# Patient Record
Sex: FEMALE | Race: WHITE
[De-identification: names, ages, dates, MRNs, and addresses within clinical notes are randomized per-mention and may not be internally consistent; named-entity substitution may affect disease eponyms.]

---

## 2017-07-14 ENCOUNTER — HOSPITAL ENCOUNTER (EMERGENCY)
Dept: HOSPITAL 75 - ER | Age: 22
Discharge: HOME | End: 2017-07-14
Payer: COMMERCIAL

## 2017-07-14 VITALS — BODY MASS INDEX: 24.16 KG/M2 | HEIGHT: 65 IN | WEIGHT: 145 LBS

## 2017-07-14 VITALS — SYSTOLIC BLOOD PRESSURE: 116 MMHG | DIASTOLIC BLOOD PRESSURE: 74 MMHG

## 2017-07-14 DIAGNOSIS — Z3A.08: ICD-10-CM

## 2017-07-14 DIAGNOSIS — Z98.890: ICD-10-CM

## 2017-07-14 DIAGNOSIS — O02.1: Primary | ICD-10-CM

## 2017-07-14 DIAGNOSIS — K21.9: ICD-10-CM

## 2017-07-14 LAB
BILIRUB UR QL STRIP: NEGATIVE
KETONES UR QL STRIP: NEGATIVE
LEUKOCYTE ESTERASE UR QL STRIP: NEGATIVE
NITRITE UR QL STRIP: NEGATIVE
PH UR STRIP: 6.5 [PH] (ref 5–9)
PROT UR QL STRIP: NEGATIVE
SP GR UR STRIP: 1.01 (ref 1.02–1.02)
SQUAMOUS #/AREA URNS HPF: (no result) /HPF
UROBILINOGEN UR-MCNC: NORMAL MG/DL
WBC #/AREA URNS HPF: (no result) /HPF

## 2017-07-14 PROCEDURE — 99282 EMERGENCY DEPT VISIT SF MDM: CPT

## 2017-07-14 PROCEDURE — 81000 URINALYSIS NONAUTO W/SCOPE: CPT

## 2017-07-14 PROCEDURE — 76817 TRANSVAGINAL US OBSTETRIC: CPT

## 2017-07-14 PROCEDURE — 84702 CHORIONIC GONADOTROPIN TEST: CPT

## 2017-07-14 PROCEDURE — 36415 COLL VENOUS BLD VENIPUNCTURE: CPT

## 2017-07-14 NOTE — ED GU-FEMALE
General


Chief Complaint:  -Female


Stated Complaint:  POSS MISCARRIAGE


Nursing Triage Note:  


c/o vaginal bleeding since Wed. Cramping started last night. Hx of 6 weeks 


gestation.


Nursing Sepsis Screen:  No Definite Risk


Source:  patient, spouse


Exam Limitations:  no limitations





History of Present Illness


Time seen by provider:  12:41


Initial Comments


21-year-old female patient presents to the emergency department complains of 

vaginal bleeding beginning Wednesday.  Reports today bleeding is similar to a 

menstruation.  Denies fever, nausea, vomiting, yellow or green discharge, 

difficulty with urination.  Patient states she is approximately 6 weeks 

pregnant.  Patient was seen by Dr. Barboza last Thursday with pelvic exam 

performed.


Timing/Duration:  getting worse, other (onset Wednesday.)


Severity/Quality:  cramping


Location:  suprapubic


Radiation:  none


Activities at Onset:  none


Prior Genitourinary Problems:  none


Sexual [place]lin History:  less than 2 months ago, single partner





Allergies and Home Medications


Allergies


Coded Allergies:  


     No Known Drug Allergies (Unverified , 16)





Home Medications


Prenatal Vit W-Ca,Fe,FA(<1 mg) 1 Each Tablet, 1 TAB PO DAILY, (Reported)





Constitutional:  No chills, No dizziness, No fever, No malaise


Respiratory:  no symptoms reported


Cardiovascular:  no symptoms reported


Gastrointestinal:  abdominal pain (suprapubic abdominal cramping), No 

constipation, No diarrhea, No loss of appetite, No nausea, No vomiting


Genitourinary:  see HPI, denies burning, denies discharge, denies dysuria, 

denies frequency, denies flank pain, denies hematuria, pain (intermittent 

suprapubic abdominal cramping), other (vaginal bleeding)


Pregnant:  Yes


Expected Date of Delivery:  2018


LMP:  May 15, 2017


Musculoskeletal:  No back pain


Skin:  no symptoms reported


Psychiatric/Neurological:  No Symptoms Reported


All Other Systemes Reviewed


Negative Unless Noted:  Yes (Negative excepted noted.)





Past Medical-Social-Family Hx


Patient Social History


Recent Foreign Travel:  No


Contact w/Someone Who Travel:  No


Recent Infectious Disease Expo:  No





Immunizations Up To Date


Tetanus Booster (TDap):  Unknown


PED Vaccines UTD:  No





Surgeries


HX Surgeries:  Yes (left knee)





Respiratory


Hx Respiratory Disorders:  No





Cardiovascular


Hx Cardiac Disorders:  No





Neurological


Hx Neurological Disorders:  No





Reproductive System


Pregnant:  Yes


Hx :  2


Hx Para:  1


Hx Total # of Abortions (Spona:  0


Hx Reproductive Disorders:  No


Female Reproductive Disorders:  Denies





Genitourinary


Hx Genitourinary Disorders:  No





Gastrointestinal


Hx Gastrointestinal Disorders:  Yes


Gastrointestinal Disorders:  Gastroesophageal Reflux





Musculoskeletal


Hx Musculoskeletal Disorders:  No





Endocrine


Hx Endocrine Disorders:  No





HEENT


HX ENT Disorders:  No





Cancer


Hx Cancer:  No





Psychosocial


Hx Psychiatric Problems:  No





Integumentary


HX Skin/Integumentary Disorder:  No





Blood Transfusions


Hx Blood Disorders:  No





Reviewed Nursing Assessment


Reviewed/Agree w Nursing PMH:  Yes





Family Medical History


Significant Family History:  No Pertinent Family Hx


Family Medial History:  


Alzheimer's disease


  19 MOTHER (grandfather)


Completed stroke


  19 FATHER (grandfather)


Diabetes mellitus


  19 FATHER (grandparent)


  19 MOTHER (maternal grandparent)


Hypertension


  19 MOTHER (mother)


Non-Hodgkin's lymphoma


Respiratory disorder


  19 MOTHER (copd grandmother)





Physical Exam


Vital Signs





Vital Sign - Last 12Hours








 17





 12:19


 


Temp 97.5


 


Pulse 82


 


Resp 16


 


B/P (MAP) 117/76


 


Pulse Ox 98





Capillary Refill : Less Than 3 Seconds


General Appearance:  WD/WN, no apparent distress


HEENT:  PERRL/EOMI, pharynx normal


Neck:  supple, normal inspection


Cardiovascular:  regular rate, rhythm, no edema, no murmur


Respiratory:  lungs clear, normal breath sounds, no respiratory distress


Gastrointestinal:  normal bowel sounds, non tender (unable to reproduce 

tenderness on exam.), soft, no organomegaly


Back:  normal inspection


Extremities:  no pedal edema, normal capillary refill


Neurologic/Psychiatric:  alert, normal mood/affect, oriented x 3


Skin:  normal color, warm/dry





Progress/Results/Core Measures


Results/Orders


Lab Results





Laboratory Tests








Test


  17


12:51 17


13:35 Range/Units


 


 


Human Chorionic Gonadotropin,


Quant 2914 H


  


  <5  MIU/ML


 


 


Urine Color  YELLOW   


 


Urine Clarity  CLEAR   


 


Urine pH  6.5  5-9  


 


Urine Specific Gravity  1.015 L 1.016-1.022  


 


Urine Protein  NEGATIVE  NEGATIVE  


 


Urine Glucose (UA)  NEGATIVE  NEGATIVE  


 


Urine Ketones  NEGATIVE  NEGATIVE  


 


Urine Nitrite  NEGATIVE  NEGATIVE  


 


Urine Bilirubin  NEGATIVE  NEGATIVE  


 


Urine Urobilinogen  NORMAL  NORMAL  MG/DL


 


Urine Leukocyte Esterase  NEGATIVE  NEGATIVE  


 


Urine RBC (Auto)  5+ H NEGATIVE  


 


Urine RBC  RARE   /HPF


 


Urine WBC  NONE   /HPF


 


Urine Squamous Epithelial


Cells 


  2-5 


   /HPF


 


 


Urine Crystals  NONE   /LPF


 


Urine Bacteria  NEGATIVE   /HPF


 


Urine Casts  NONE   /LPF


 


Urine Mucus  NEGATIVE   /LPF


 


Urine Culture Indicated  NO   








My Orders





Orders - OSEI ROBINS


Hcg,Quantitative (17 12:23)


Us Ob Transvaginal 12048 (17 12:23)


Ua Culture If Indicated (17 13:21)





Vital Signs/I&O





Vital Sign - Last 12Hours








 17





 12:19


 


Temp 97.5


 


Pulse 82


 


Resp 16


 


B/P (MAP) 117/76


 


Pulse Ox 98














Blood Pressure Mean:  90











Diagnostic Imaging





   Diagonstic Imaging:  Ultrasound


   Plain Films/CT/US/NM/MRI:  pelvis


Comments


Single intrauterine in fetus measuring 6 weeks 4 days.  No cardiac motion noted 

suggesting fetal demise.  Can not exclude early pregnancy findings.  findings 

per Dr. Griggs


   Reviewed:  Discussed w/Radiologist (discussed with Dr. Joseph Griggs)





Departure


Communication


Progress Notes


Patient seen and evaluated.  Patient initially reported being 6 weeks gestation

; however, based on LMP patient is 8 wks 4 days.  All laboratory and diagnostic 

findings discussed with the patient.  Patient and spouse both report they were 

able to find a fetal heart rate of 115 bpm last week in Dr. Barboza's 

office.  In light of previously hearing FHT's, findings of U/S today are 

indicative of missed AB with fetal demise. Patient instructed to follow-up with 

Dr. Barboza Monday or Tuesday in his office and to call Monday morning for 

appointment time.  Patient given an outpatient order for repeat HCG quant on .  All return precautions were discussed with the patient as described in 

the discharge instructions of this report.  Patient voices understanding and 

agrees with the treatment plan.  Patient case discussed with Dr. Cullen, he 

agrees with the plan of care.





Impression


Impression:  


 Primary Impression:  


 Missed  with fetal demise before 20 completed weeks of gestation


Disposition:  01 HOME, SELF-CARE


Condition:  Improved





Departure-Patient Inst.


Decision time for Depature:  14:37


Referrals:  


PAULINO BARBOZA MD, CHAD C MD (PCP/Family)


Primary Care Physician


Patient Instructions:  Threatened Miscarriage (DC)





Add. Discharge Instructions:  


All discharge instructions reviewed with patient and/or family. Voiced 

understanding.  Tylenol extra strength over-the-counter as directed for pain.  

Drink plenty of fluids.  No intercourse, use of tampons, or strenuous activity 

until released by Dr. Barboza.  Follow-up with Dr. Barboza Monday or 

Tuesday for recheck, repeat ultrasound, and repeat labs.  Call first thing 

Monday morning for appointment time.  Return to the emergency department for 

worsened pain, fever, vaginal bleeding with greater than 2 pads per hour for 

greater than 2 hours, vaginal discharge, difficulty with urination, or any 

other concerns.


Work/School Note:  Work Release Form   Date Seen in the Emergency Department:  

2017


   Return to Work:  Jul 15, 2017











OSEI ROBINS 2017 12:41

## 2017-07-14 NOTE — XMS REPORT
Continuity of Care Document

 Created on: 2017



GOGO NEGRON

External Reference #: [id number]

: 1995

Sex: Female



Demographics







 Address  [address] [place]  [address]

 

 Home Phone  [phone]

 

 Preferred Language  Unknown

 

 Marital Status  Unknown

 

 Temple Affiliation  Unknown

 

 Race  Unknown

 

 Ethnic Group  Unknown





Author







 Author  [facility]

 

 Address  Unknown

 

 Phone  Unavailable



                                                      



Allergies

                      





 Active                    Description                    Code                  
  Type                    Severity                    Reaction                  
  Onset                    Reported/Identified                    Relationship 
to Patient                    Clinical Status                

 

 Yes                    No Known Drug Allergies                    [id number]  
                  Drug Allergy                    Unknown                    N/
A                                         2016                           
                               



                                                                               
         



Medications

                                                                               
         



Problems

                      





 Date Dx Coded                    Attending                    Type            
        Code                    Diagnosis                    Diagnosed By      
          

 

 2011                    AAYUSH CARROLL DO                                
         V05.8                    GARDASIL                                     

 

 2012                    AAYUSH CARROLL DO                                
         V74.1                    TB SCREENING                                 
    

 

 2014                    AAYUSH CARROLL DO                                
         V25.01                    CONTRACEPTION - ORAL CONTRACEPTION          
                           

 

 2014                    AAYUSH CARROLL DO                                
         V74.5                    STD SCREEN                                   
  

 

 2015                                         Ot                    836.0
                                                          

 

 2015                                         Ot                    
E000.8                                                          

 

 2015                                         Ot                    
E007.6                                                          

 

 2015                                         Ot                    
E849.4                                                          

 

 2015                                         Ot                    
E928.9                                                          

 

 2015                    ISMAEL FERNANDEZ MD                    Ot         
           V28.81                                                          

 

 2015                    ISMAEL FERNANDEZ MD                    Ot         
           V28.81                                                          

 

 2015                    ISMAEL FERNANDEZ MD                    Ot         
           V28.81                                                          

 

 2015                    ISMAEL FERNANDEZ MD                    Ot         
           V28.81                                                          

 

 2015                    ISMAEL FERNANDEZ MD                    Ot         
           V28.81                                                          

 

 10/01/2015                    ISMAEL FERNANDEZ MD                    Ot         
           V28.81                                                          

 

 2015                                         Ot                    836.0
                                                          

 

 2015                                         Ot                    
E000.8                                                          

 

 2015                                         Ot                    
E007.6                                                          

 

 2015                                         Ot                    
E849.4                                                          

 

 2015                                         Ot                    
E928.9                                                          

 

 2015                    ISMAEL FERNANDEZ MD                    Ot         
           V28.81                                                          

 

 2015                    ISMAEL FERNANDEZ MD                    Ot         
           V28.81                                                          

 

 2015                    ISMAEL FERNANDEZ MD                    Ot         
           O36.5930                                                          

 

 2016                    ISMAEL FERNANDEZ MD                    Ot         
           O41.03X0                                                          

 

 2016                    ISMAEL FERNANDEZ MD                    Ot         
           Z3A.36                                                          

 

 2016                    ISMAEL FERNANDEZ MD                    Ot         
           O36.5930                                                          

 

 2016                    ISMAEL FERNANDEZ MD                    Ot         
           Z3A.00                                                          

 

 2016                    REBECCA JAUREGUI, ISMAEL GAN                    Ot         
           O41.03X0                                                          

 

 2016                    REBECCA JAUREGUI, ISMAEL GAN                    Ot         
           O41.03X0                                                          

 

 2016                    REBECCA JAUREGUI, ISMAEL GAN                    Ot         
           O41.03X0                                                          

 

 2016                    REBECCA JAUREGUI, ISMAEL GAN                    Ot         
           Z3A.00                                                          

 

 2016                    REBECCA JAUREGUI, ISMAEL GAN                    Ot         
           O36.5930                                                          

 

 2016                    REBECCA JAUREGUI, ISMAEL GAN                    Ot         
           O41.03X0                                                          

 

 2016                    REBECCA JAUREGUI, ISMAEL GAN                    Ot         
           Z3A.35                                                          

 

 2016                    REBECCA JAUREGUI, ISMAEL GAN                    Ot         
           O41.03X0                                                          

 

 2016                    REBECCA JAUREGUI, ISMAEL GAN                    Ot         
           O72.1                                                          

 

 2016                    REBECCA JAUREGUI, ISMAEL GAN                    Ot         
           Z23                                                          

 

 2016                    REBECCA JAUREGUI, ISMAEL GAN                    Ot         
           Z37.0                                                          

 

 2016                    REBECCA JAUREGUI, ISMAEL GAN                    Ot         
           O36.5930                                                          

 

 2016                    REBECCA JAUREGUI, ISMAEL GAN                    Ot         
           O36.5930                                                          

 

 2016                    REBECCA JAUREGUI, ISMAEL GAN                    Ot         
           O41.03X0                                                          

 

 2016                    REBECCA JAUREGUI, ISMAEL GAN                    Ot         
           Z3A.35                                                          

 

 2016                    REBECCA JAUREGUI, ISMAEL GAN                    Ot         
           O41.03X0                                                          

 

 2016                    ISMAEL FERNANDEZ MD                    Ot         
           Z3A.00                                                          

 

 2016                    REBECCA JAUREGUI, ISMAEL GAN                    Ot         
           O41.03X0                                                          

 

 2016                    ISMAEL FERNANDEZ MD                    Ot         
           Z3A.00                                                          

 

 2016                    REBECCA JAUREGUI, ISMAEL GAN                    Ot         
           O41.03X0                                                          

 

 2016                    ISMAEL FERNANDEZ MD                    Ot         
           Z3A.00                                                          

 

 2016                    ISMAEL FERNANDEZ MD                    Ot         
           O41.03X0                    OLIGOHYDRAMNIOS, THIRD TRIMESTER, NOT AP
                                     

 

 2016                    ISMAEL FERNANDEZ MD                    Ot         
           Z3A.00                    WEEKS OF GESTATION OF PREGNANCY NOT SPEC  
                                   

 

 2016                    ISMAEL FERNANDEZ MD                    Ot         
           O41.03X0                    OLIGOHYDRAMNIOS, THIRD TRIMESTER, NOT AP
                                     

 

 2016                    ISMAEL FERNANDEZ MD                    Ot         
           Z3A.00                    WEEKS OF GESTATION OF PREGNANCY NOT SPEC  
                                   



                                                                               
                                                                               
                                                                               
                                                                               
                                                                               
                                                                               
                                                                               
                                             



Procedures

                      





 Code                    Description                    Performed By           
         Performed On                

 

                     09732                                                     
     PREGNANCY TEST, URINE (IN-HOUSE)                                          
                 2014                

 

                     51310                                                     
     GC/CHLAM URINE ([facility])                                                    
       2014                



                                                                               
                   



Results

                                                                    



Encounters

                      





 ACCT No.                    Visit Date/Time                    Discharge      
              Status                    Pt. Type                    Provider   
                 Facility                    Loc./Unit                    
Complaint                

 

 [id number]                    2014 16:28:00                    2014 23:
59:59                    [facility]                    Outpatient                    
AAYUSH CARROLL DO

## 2017-07-14 NOTE — DIAGNOSTIC IMAGING REPORT
Obstetrical sonogram.



INDICATION: Evaluate pregnancy.



FINDINGS: There is a single intrauterine gestation demonstrated.

Based on the patient 's reported last menstrual period, the

gestation should be 8 weeks and 4 days. The crown-rump length of

the fetal pole is measuring at 6 weeks 4 days. There is no

demonstration of cardiac motion.



There is a large right ovarian corpus luteal cyst. The ovary

maintains normal Doppler blood flow. The left ovary is not

demonstrated.



IMPRESSION:

There is an intrauterine gestation with a fetal pole and a yolk

sac. Fetal pole measures at 6 weeks 4 days, and there is no

evidence of cardiac motion. While the features are suspect for

fetal cardiac demise, given a reported estimated gestational age

of 8 weeks 4 days based on last menstrual period, an early

gestation is not yet excluded. Recommend continued correlation

with serial quantitative beta hCGs and repeat sonographic imaging

as clinically indicated.



These findings were discussed with SABINA Mistry, in the [facility] Emergency Department prior to this dictation.



Dictated by: 



  Dictated on workstation # [id number]

## 2018-03-01 ENCOUNTER — HOSPITAL ENCOUNTER (INPATIENT)
Dept: HOSPITAL 75 - LDRP | Age: 23
LOS: 4 days | Discharge: HOME | DRG: 781 | End: 2018-03-05
Attending: OBSTETRICS & GYNECOLOGY | Admitting: OBSTETRICS & GYNECOLOGY
Payer: COMMERCIAL

## 2018-03-01 ENCOUNTER — HOSPITAL ENCOUNTER (OUTPATIENT)
Dept: HOSPITAL 75 - WSO | Age: 23
End: 2018-03-01
Attending: OBSTETRICS & GYNECOLOGY
Payer: COMMERCIAL

## 2018-03-01 VITALS — DIASTOLIC BLOOD PRESSURE: 57 MMHG | SYSTOLIC BLOOD PRESSURE: 105 MMHG

## 2018-03-01 VITALS — SYSTOLIC BLOOD PRESSURE: 117 MMHG | DIASTOLIC BLOOD PRESSURE: 67 MMHG

## 2018-03-01 VITALS — DIASTOLIC BLOOD PRESSURE: 67 MMHG | SYSTOLIC BLOOD PRESSURE: 112 MMHG

## 2018-03-01 VITALS — DIASTOLIC BLOOD PRESSURE: 71 MMHG | SYSTOLIC BLOOD PRESSURE: 112 MMHG

## 2018-03-01 VITALS — BODY MASS INDEX: 26.16 KG/M2 | WEIGHT: 157 LBS | HEIGHT: 65 IN

## 2018-03-01 DIAGNOSIS — N13.2: ICD-10-CM

## 2018-03-01 DIAGNOSIS — Z3A.28: ICD-10-CM

## 2018-03-01 DIAGNOSIS — Z31.82: Primary | ICD-10-CM

## 2018-03-01 DIAGNOSIS — O26.833: Primary | ICD-10-CM

## 2018-03-01 LAB
APTT PPP: YELLOW S
BACTERIA #/AREA URNS HPF: NEGATIVE /HPF
BILIRUB UR QL STRIP: NEGATIVE
ERYTHROCYTE [DISTWIDTH] IN BLOOD BY AUTOMATED COUNT: 13 % (ref 10–14.5)
FIBRINOGEN PPP-MCNC: CLEAR MG/DL
GLUCOSE UR STRIP-MCNC: NEGATIVE MG/DL
HCT VFR BLD CALC: 31 % (ref 35–52)
HGB BLD-MCNC: 10.8 G/DL (ref 11.5–16)
KETONES UR QL STRIP: NEGATIVE
LEUKOCYTE ESTERASE UR QL STRIP: NEGATIVE
MCH RBC QN AUTO: 32 PG (ref 25–34)
MCHC RBC AUTO-ENTMCNC: 35 G/DL (ref 32–36)
MCV RBC AUTO: 91 FL (ref 80–99)
NITRITE UR QL STRIP: NEGATIVE
PH UR STRIP: 7 [PH] (ref 5–9)
PLATELET # BLD: 225 10^3/UL (ref 130–400)
PMV BLD AUTO: 10 FL (ref 7.4–10.4)
PROT UR QL STRIP: NEGATIVE
RBC # BLD AUTO: 3.42 10^6/UL (ref 4.35–5.85)
RBC #/AREA URNS HPF: (no result) /HPF
SP GR UR STRIP: 1 (ref 1.02–1.02)
SQUAMOUS #/AREA URNS HPF: (no result) /HPF
UROBILINOGEN UR-MCNC: NORMAL MG/DL
WBC # BLD AUTO: 11.9 10^3/UL (ref 4.3–11)
WBC #/AREA URNS HPF: (no result) /HPF

## 2018-03-01 PROCEDURE — 76770 US EXAM ABDO BACK WALL COMP: CPT

## 2018-03-01 PROCEDURE — 36415 COLL VENOUS BLD VENIPUNCTURE: CPT

## 2018-03-01 PROCEDURE — 74176 CT ABD & PELVIS W/O CONTRAST: CPT

## 2018-03-01 PROCEDURE — 85027 COMPLETE CBC AUTOMATED: CPT

## 2018-03-01 PROCEDURE — 87088 URINE BACTERIA CULTURE: CPT

## 2018-03-01 PROCEDURE — 81000 URINALYSIS NONAUTO W/SCOPE: CPT

## 2018-03-01 PROCEDURE — 85025 COMPLETE CBC W/AUTO DIFF WBC: CPT

## 2018-03-01 PROCEDURE — 96372 THER/PROPH/DIAG INJ SC/IM: CPT

## 2018-03-01 PROCEDURE — 80053 COMPREHEN METABOLIC PANEL: CPT

## 2018-03-01 RX ADMIN — BUTORPHANOL TARTRATE PRN MG: 2 INJECTION, SOLUTION INTRAMUSCULAR; INTRAVENOUS at 12:55

## 2018-03-01 RX ADMIN — BUTORPHANOL TARTRATE PRN MG: 2 INJECTION, SOLUTION INTRAMUSCULAR; INTRAVENOUS at 21:37

## 2018-03-01 RX ADMIN — SODIUM CHLORIDE, SODIUM LACTATE, POTASSIUM CHLORIDE, CALCIUM CHLORIDE, AND DEXTROSE MONOHYDRATE SCH MLS/HR: 600; 310; 30; 20; 5 INJECTION, SOLUTION INTRAVENOUS at 14:26

## 2018-03-01 RX ADMIN — BUTORPHANOL TARTRATE PRN MG: 2 INJECTION, SOLUTION INTRAMUSCULAR; INTRAVENOUS at 23:44

## 2018-03-01 RX ADMIN — SODIUM CHLORIDE SCH MLS/HR: 900 INJECTION INTRAVENOUS at 20:27

## 2018-03-01 RX ADMIN — SODIUM CHLORIDE, SODIUM LACTATE, POTASSIUM CHLORIDE, CALCIUM CHLORIDE, AND DEXTROSE MONOHYDRATE SCH MLS/HR: 600; 310; 30; 20; 5 INJECTION, SOLUTION INTRAVENOUS at 21:14

## 2018-03-01 RX ADMIN — SODIUM CHLORIDE, SODIUM LACTATE, POTASSIUM CHLORIDE, CALCIUM CHLORIDE, AND DEXTROSE MONOHYDRATE SCH MLS/HR: 600; 310; 30; 20; 5 INJECTION, SOLUTION INTRAVENOUS at 11:49

## 2018-03-01 RX ADMIN — BUTORPHANOL TARTRATE PRN MG: 2 INJECTION, SOLUTION INTRAMUSCULAR; INTRAVENOUS at 19:37

## 2018-03-01 RX ADMIN — SODIUM CHLORIDE, SODIUM LACTATE, POTASSIUM CHLORIDE, CALCIUM CHLORIDE, AND DEXTROSE MONOHYDRATE SCH MLS/HR: 600; 310; 30; 20; 5 INJECTION, SOLUTION INTRAVENOUS at 16:57

## 2018-03-01 RX ADMIN — BUTORPHANOL TARTRATE PRN MG: 2 INJECTION, SOLUTION INTRAMUSCULAR; INTRAVENOUS at 16:57

## 2018-03-01 NOTE — HISTORY & PHYSICAL
History and Physical


Date Seen by Provider:  Mar 1, 2018


Time Seen by Provider:  17:34


This patient is a 22-year-old G1 white female currently at 28 weeks gestation.  

She was seen in clinic today with complaint of severe pelvic right side and 

right flank pain.  She reported having blood bloody fluid oozing from the 

vagina.  On exam there was no blood in the vaginal vault.  Her urine was 

grossly bloody.  Her presentation was a fairly typical for a ureteral stone.  

She was sent to labor and delivery for evaluation and further management.  UA 

was consistent with nephrolithiasis/ureteral lithiasis.  Patient has been 

treated with IV fluids and IV analgesics with good effect.  An ultrasound of 

the kidney and ureters showed a dilated right ureter that appeared to be 

obstructed and no ureteral jets in the bladder from the right ureter.  The left 

kidney and ureter were normal.





Dr. Villalba was consult via telephone by the labor and delivery nurse(Anali) and 

requested a limited noncontrast CT which has been ordered.  I was called by a 

radiology tech with concerns about doing a CT on a pregnant woman they offered 

a KUB and some other type of ultrasound.  I agree that they could do whatever 

studies would be eliminating and with the Med requested Dr. Villalba gave to 

determine if there was specifically a stone.  Thus far nothing further has been 

done.





This evening the patient is comfortable after just having it dose of Stadol.  

She is voiding well.  She is empirically on Ancef.





Allergies are none





Medications are prenatal vitamins





Medical social and surgical histories are per the antepartum record





HEENT exam is normal


Neck is supple no lymphadenopathy no thyromegaly


Abdomen is gravid soft nontender nondistended


Extremities show no clubbing or cyanosis.  There is minimal pretibial pitting 

edema.  There is no Homans sign.





There is mild right CVA tenderness.  There is no paraspinal rigidity.


Pelvic exam shows a cervix that is thick and closed on exam performed in my 

clinic





Fetal monitor shows a normal NST








Laboratory Tests








Test


  3/1/18


11:48 3/1/18


12:10 Range/Units


 


 


White Blood Count


  11.9 H


  


  4.3-11.0


10^3/uL


 


Red Blood Count


  3.42 L


  


  4.35-5.85


10^6/uL


 


Hemoglobin 10.8 L  11.5-16.0  G/DL


 


Hematocrit 31 L  35-52  %


 


Mean Corpuscular Volume 91   80-99  FL


 


Mean Corpuscular Hemoglobin 32   25-34  PG


 


Mean Corpuscular Hemoglobin


Concent 35 


  


  32-36  G/DL


 


 


Red Cell Distribution Width 13.0   10.0-14.5  %


 


Platelet Count


  225 


  


  130-400


10^3/uL


 


Mean Platelet Volume 10.0   7.4-10.4  FL


 


Urine Color  YELLOW   


 


Urine Clarity  CLEAR   


 


Urine pH  7  5-9  


 


Urine Specific Gravity  1.005 L 1.016-1.022  


 


Urine Protein  NEGATIVE  NEGATIVE  


 


Urine Glucose (UA)  NEGATIVE  NEGATIVE  


 


Urine Ketones  NEGATIVE  NEGATIVE  


 


Urine Nitrite  NEGATIVE  NEGATIVE  


 


Urine Bilirubin  NEGATIVE  NEGATIVE  


 


Urine Urobilinogen  NORMAL  NORMAL  MG/DL


 


Urine Leukocyte Esterase  NEGATIVE  NEGATIVE  


 


Urine RBC (Auto)  4+ H NEGATIVE  


 


Urine RBC  25-50 H  /HPF


 


Urine WBC  RARE   /HPF


 


Urine Squamous Epithelial


Cells 


  0-2 


   /HPF


 


 


Urine Crystals  NONE   /LPF


 


Urine Bacteria  NEGATIVE   /HPF


 


Urine Casts  NONE   /LPF


 


Urine Mucus  NEGATIVE   /LPF


 


Urine Culture Indicated  NO   














UA is as noted





Assessment and plan   28 week intrauterine pregnancy in a patient with renal 

colic likely secondary to obstructed right ureter possibly due to a stone 

versus physiologic pregnancy related obstruction versus other.  We will 

continue the current management with IV fluids IV analgesics and IV 

antibiotics.  Further workup in the form of appropriate imaging is pending 

radiology.


Renal colic/nephrolithiasis





Allergies and Home Medications


Allergies


Coded Allergies:  


     No Known Drug Allergies (Unverified , 1/22/16)





Patient Home Medication List


Home Medication List Reviewed:  Yes











PAULINO RESTREPO MD Mar 1, 2018 17:40

## 2018-03-01 NOTE — DIAGNOSTIC IMAGING REPORT
PROCEDURE: CT urinary tract, rule out kidney stone.



TECHNIQUE: Multiple contiguous axial images were obtained through

the abdomen and pelvis without the use of intravenous contrast.



INDICATION: Right-sided hydronephrosis on ultrasound from earlier

today. No history of stones.



FINDINGS: Noncontrast CT scan of the abdomen and pelvis

demonstrates 6.3 mm calculus in the right ureterovesical junction

possibly extending into the urinary bladder. Moderate to severe

right hydronephrosis is present. Minimal left hydronephrosis is

present. There is a gravid uterus. No free fluid is present. The

lung bases are clear. The liver, gallbladder, spleen, pancreas,

adrenal glands and kidneys are normal. The bowel loops appear

normal. The osseous structures are normal.



IMPRESSION: There is a 6 mm calculi in the right ureterovesical

junction with moderate to severe right hydronephrosis. Gravid

uterus is present with mild left hydronephrosis.



Dictated by: 



  Dictated on workstation # [id number]

## 2018-03-01 NOTE — DIAGNOSTIC IMAGING REPORT
INDICATION: Right flank pain and hematuria.



TECHNIQUE: Bilateral renal sonography performed in the routine

fashion.



COMPARISON: There is no previous study for comparison.



FINDINGS: On the right side, the kidney measured 12.8 x 6.9 x 7.7

cm. There was significant right hydronephrosis.



On the left side, the kidney measured 9.8 x 5.8 x 5.5 cm. There

was no left hydronephrosis.



Visualized portions of the bladder were unremarkable. Left

ureteral jet was visualized, right ureteral jet was not seen.



IMPRESSION:



There is significant right-sided hydronephrosis. There was no

right ureteral jet visualized. Left kidney shows no

hydronephrosis. Urinary bladder is otherwise unremarkable.



Dictated by: 



  Dictated on workstation # [id number]

## 2018-03-02 VITALS — SYSTOLIC BLOOD PRESSURE: 103 MMHG | DIASTOLIC BLOOD PRESSURE: 58 MMHG

## 2018-03-02 VITALS — DIASTOLIC BLOOD PRESSURE: 65 MMHG | SYSTOLIC BLOOD PRESSURE: 108 MMHG

## 2018-03-02 VITALS — DIASTOLIC BLOOD PRESSURE: 59 MMHG | SYSTOLIC BLOOD PRESSURE: 107 MMHG

## 2018-03-02 VITALS — SYSTOLIC BLOOD PRESSURE: 112 MMHG | DIASTOLIC BLOOD PRESSURE: 67 MMHG

## 2018-03-02 VITALS — DIASTOLIC BLOOD PRESSURE: 54 MMHG | SYSTOLIC BLOOD PRESSURE: 87 MMHG

## 2018-03-02 VITALS — DIASTOLIC BLOOD PRESSURE: 62 MMHG | SYSTOLIC BLOOD PRESSURE: 102 MMHG

## 2018-03-02 RX ADMIN — BUTORPHANOL TARTRATE PRN MG: 2 INJECTION, SOLUTION INTRAMUSCULAR; INTRAVENOUS at 06:49

## 2018-03-02 RX ADMIN — SODIUM CHLORIDE, SODIUM LACTATE, POTASSIUM CHLORIDE, CALCIUM CHLORIDE, AND DEXTROSE MONOHYDRATE SCH MLS/HR: 600; 310; 30; 20; 5 INJECTION, SOLUTION INTRAVENOUS at 14:31

## 2018-03-02 RX ADMIN — OXYCODONE HYDROCHLORIDE AND ACETAMINOPHEN PRN TAB: 10; 325 TABLET ORAL at 14:56

## 2018-03-02 RX ADMIN — SODIUM CHLORIDE SCH MLS/HR: 900 INJECTION INTRAVENOUS at 22:28

## 2018-03-02 RX ADMIN — BUTORPHANOL TARTRATE PRN MG: 2 INJECTION, SOLUTION INTRAMUSCULAR; INTRAVENOUS at 02:17

## 2018-03-02 RX ADMIN — SODIUM CHLORIDE, SODIUM LACTATE, POTASSIUM CHLORIDE, CALCIUM CHLORIDE, AND DEXTROSE MONOHYDRATE SCH MLS/HR: 600; 310; 30; 20; 5 INJECTION, SOLUTION INTRAVENOUS at 23:28

## 2018-03-02 RX ADMIN — SODIUM CHLORIDE, SODIUM LACTATE, POTASSIUM CHLORIDE, CALCIUM CHLORIDE, AND DEXTROSE MONOHYDRATE SCH MLS/HR: 600; 310; 30; 20; 5 INJECTION, SOLUTION INTRAVENOUS at 19:00

## 2018-03-02 RX ADMIN — SODIUM CHLORIDE SCH MLS/HR: 900 INJECTION INTRAVENOUS at 14:55

## 2018-03-02 RX ADMIN — OXYCODONE HYDROCHLORIDE AND ACETAMINOPHEN PRN TAB: 10; 325 TABLET ORAL at 19:47

## 2018-03-02 RX ADMIN — SODIUM CHLORIDE, SODIUM LACTATE, POTASSIUM CHLORIDE, CALCIUM CHLORIDE, AND DEXTROSE MONOHYDRATE SCH MLS/HR: 600; 310; 30; 20; 5 INJECTION, SOLUTION INTRAVENOUS at 01:22

## 2018-03-02 RX ADMIN — SODIUM CHLORIDE SCH MLS/HR: 900 INJECTION INTRAVENOUS at 08:00

## 2018-03-02 RX ADMIN — BUTORPHANOL TARTRATE PRN MG: 2 INJECTION, SOLUTION INTRAMUSCULAR; INTRAVENOUS at 12:19

## 2018-03-02 RX ADMIN — SODIUM CHLORIDE SCH MLS/HR: 900 INJECTION INTRAVENOUS at 02:17

## 2018-03-02 RX ADMIN — SODIUM CHLORIDE, SODIUM LACTATE, POTASSIUM CHLORIDE, CALCIUM CHLORIDE, AND DEXTROSE MONOHYDRATE SCH MLS/HR: 600; 310; 30; 20; 5 INJECTION, SOLUTION INTRAVENOUS at 05:52

## 2018-03-02 RX ADMIN — BUTORPHANOL TARTRATE PRN MG: 2 INJECTION, SOLUTION INTRAMUSCULAR; INTRAVENOUS at 09:04

## 2018-03-02 RX ADMIN — BUTORPHANOL TARTRATE PRN MG: 2 INJECTION, SOLUTION INTRAMUSCULAR; INTRAVENOUS at 04:21

## 2018-03-02 RX ADMIN — OXYCODONE HYDROCHLORIDE AND ACETAMINOPHEN PRN TAB: 10; 325 TABLET ORAL at 23:49

## 2018-03-02 NOTE — PROGRESS NOTE-STANDARD
Standard Progress Note


Progress Notes/Assess & Plan


Date Seen by Provider:  Mar 2, 2018


Time Seen by Provider:  07:52


Progress/Assessment & Plan


Patient complains of persistent right flank and right lower quadrant pain.  CT 

yesterday demonstrated a 6 mm calculus at the ureterovesical  conjunction.  

Patient's pain is controlled adequately with Stadol.  She denies rupture 

membranes or bleeding.  She does feel baby moving and denies contractions.








Vital Signs








  Date Time  Temp Pulse Resp B/P (MAP) Pulse Ox O2 Delivery O2 Flow Rate FiO2


 


3/2/18 04:23 97.6 92 18 108/65 (79) 100   


 


3/1/18 23:45 97.6 78 18 105/57 (73) 98   


 


3/1/18 19:35 98.8 81 18 112/71 (85) 98   


 


3/1/18 17:00 98.3 88 18 117/67 (84) 98 Room Air  


 


3/1/18 11:40 97.2 79 18 112/67 (82)  Room Air  














I & O 


 


 3/2/18





 07:00


 


Intake Total 5550 ml


 


Output Total 2150 ml


 


Balance 3400 ml





vital signs are stable patient is afebrile





Abdomen is gravid soft nontender nondistended


Extreme show clubbing cyanosis.  There is no Homans sign.





Assessment and plan   right ureteral stone on CT that appears almost into the 

bladder.  We'll continue fluids and analgesia and antibiotics and observe for 

spontaneous passage of stone.  If symptoms worsen and we will consider urology 

consult for management.  This likely would require transfer as the local 

urologist is out of town











PAULINO RESTREPO MD Mar 2, 2018 07:54

## 2018-03-02 NOTE — PROGRESS NOTE-STANDARD
Standard Progress Note


Progress Notes/Assess & Plan


Date Seen by Provider:  Mar 2, 2018


Time Seen by Provider:  14:05


Progress/Assessment & Plan


Patient complains of persistent right flank and right lower quadrant pain.  CT 

yesterday demonstrated a 6 mm calculus at the ureterovesical  conjunction.  

Patient's pain is controlled adequately with Stadol.  She denies rupture 

membranes or bleeding.  She does feel baby moving and denies contractions.








Vital Signs








  Date Time  Temp Pulse Resp B/P (MAP) Pulse Ox O2 Delivery O2 Flow Rate FiO2


 


3/2/18 04:23 97.6 92 18 108/65 (79) 100   


 


3/1/18 23:45 97.6 78 18 105/57 (73) 98   


 


3/1/18 19:35 98.8 81 18 112/71 (85) 98   


 


3/1/18 17:00 98.3 88 18 117/67 (84) 98 Room Air  


 


3/1/18 11:40 97.2 79 18 112/67 (82)  Room Air  














I & O 


 


 3/2/18





 07:00


 


Intake Total 5550 ml


 


Output Total 2150 ml


 


Balance 3400 ml





vital signs are stable patient is afebrile





Abdomen is gravid soft nontender nondistended


Extreme show clubbing cyanosis.  There is no Homans sign.





Assessment and plan   right ureteral stone on CT that appears almost into the 

bladder.  We'll continue fluids and analgesia and antibiotics and observe for 

spontaneous passage of stone.  If symptoms worsen and we will consider urology 

consult for management.  This likely would require transfer as the local 

urologist is out of town





3 218 at 1405


Patient continues complaining of right flank low back and right lower quadrant 

pain.  She has not passed a stone as of yet.  Her pain is adequately controlled 

using a milligram of Stadol about every 2 hours.  She denies any other 

complaints.  She is not nauseated and is afebrile.





I discussed treatment options with her which include changing to oral 

medication which if proves satisfactory pain control and consider allowing her 

discharge home with follow-up with urologist next week versus transfer to where 

they have immediate urologic care which is lacking progress at the moment as a 

urologist is out of town versus continued present management until Monday when 

our urologist will return.





Patient would really prefer not to have to be transferred.  She expresses 

reservations about discharge home with the degree of pain that she is having.  

So current plan is to try to change to oral medication for pain management.  We'

ll continue inpatient management through the night and reevaluate tomorrow.  

Anticipation is that she will pass the stone or have urologist intervene next 

week.  Final option would be a percutaneous nephrostomy tube but considering 

the potential complications for pregnancy waiting for urologic care for removal 

of the stone is preferable





Patient agrees with this plan








Vital Signs








  Date Time  Temp Pulse Resp B/P (MAP) Pulse Ox O2 Delivery O2 Flow Rate FiO2


 


3/2/18 08:00 97.6 75 18 112/67 (82)  Room Air  


 


3/2/18 04:23 97.6 92 18 108/65 (79) 100   


 


3/1/18 23:45 97.6 78 18 105/57 (73) 98   


 


3/1/18 19:35 98.8 81 18 112/71 (85) 98   


 


3/1/18 17:00 98.3 88 18 117/67 (84) 98 Room Air  














I & O 


 


 3/2/18





 07:00


 


Intake Total 5550 ml


 


Output Total 2150 ml


 


Balance 3400 ml





The abdomen is benign.  Abdomen is gravid and nontender.


Extreme show clubbing or cyanosis.  There is no Homans sign.





Assessment and plan   as noted above











PAULINO RESTREPO MD Mar 2, 2018 2:08 pm

## 2018-03-03 VITALS — SYSTOLIC BLOOD PRESSURE: 111 MMHG | DIASTOLIC BLOOD PRESSURE: 61 MMHG

## 2018-03-03 VITALS — SYSTOLIC BLOOD PRESSURE: 115 MMHG | DIASTOLIC BLOOD PRESSURE: 70 MMHG

## 2018-03-03 VITALS — DIASTOLIC BLOOD PRESSURE: 67 MMHG | SYSTOLIC BLOOD PRESSURE: 104 MMHG

## 2018-03-03 LAB
ALBUMIN SERPL-MCNC: 2.8 GM/DL (ref 3.2–4.5)
ALP SERPL-CCNC: 53 U/L (ref 40–136)
ALT SERPL-CCNC: 9 U/L (ref 0–55)
BASOPHILS # BLD AUTO: 0 10^3/UL (ref 0–0.1)
BASOPHILS NFR BLD AUTO: 0 % (ref 0–10)
BILIRUB SERPL-MCNC: 0.5 MG/DL (ref 0.1–1)
BUN/CREAT SERPL: 7
CALCIUM SERPL-MCNC: 8.2 MG/DL (ref 8.5–10.1)
CHLORIDE SERPL-SCNC: 109 MMOL/L (ref 98–107)
CO2 SERPL-SCNC: 22 MMOL/L (ref 21–32)
CREAT SERPL-MCNC: 0.57 MG/DL (ref 0.6–1.3)
EOSINOPHIL # BLD AUTO: 0.1 10^3/UL (ref 0–0.3)
EOSINOPHIL NFR BLD AUTO: 2 % (ref 0–10)
ERYTHROCYTE [DISTWIDTH] IN BLOOD BY AUTOMATED COUNT: 13.4 % (ref 10–14.5)
GFR SERPLBLD BASED ON 1.73 SQ M-ARVRAT: > 60 ML/MIN
GLUCOSE SERPL-MCNC: 88 MG/DL (ref 70–105)
HCT VFR BLD CALC: 29 % (ref 35–52)
HGB BLD-MCNC: 9.7 G/DL (ref 11.5–16)
LYMPHOCYTES # BLD AUTO: 2.2 X 10^3 (ref 1–4)
LYMPHOCYTES NFR BLD AUTO: 29 % (ref 12–44)
MANUAL DIFFERENTIAL PERFORMED BLD QL: NO
MCH RBC QN AUTO: 32 PG (ref 25–34)
MCHC RBC AUTO-ENTMCNC: 34 G/DL (ref 32–36)
MCV RBC AUTO: 94 FL (ref 80–99)
MONOCYTES # BLD AUTO: 0.5 X 10^3 (ref 0–1)
MONOCYTES NFR BLD AUTO: 7 % (ref 0–12)
NEUTROPHILS # BLD AUTO: 4.8 X 10^3 (ref 1.8–7.8)
NEUTROPHILS NFR BLD AUTO: 63 % (ref 42–75)
PLATELET # BLD: 179 10^3/UL (ref 130–400)
PMV BLD AUTO: 9.9 FL (ref 7.4–10.4)
POTASSIUM SERPL-SCNC: 3.6 MMOL/L (ref 3.6–5)
PROT SERPL-MCNC: 5.3 GM/DL (ref 6.4–8.2)
RBC # BLD AUTO: 3.06 10^6/UL (ref 4.35–5.85)
SODIUM SERPL-SCNC: 137 MMOL/L (ref 135–145)
WBC # BLD AUTO: 7.7 10^3/UL (ref 4.3–11)

## 2018-03-03 RX ADMIN — OXYCODONE HYDROCHLORIDE AND ACETAMINOPHEN PRN TAB: 10; 325 TABLET ORAL at 04:44

## 2018-03-03 RX ADMIN — SODIUM CHLORIDE, SODIUM LACTATE, POTASSIUM CHLORIDE, CALCIUM CHLORIDE, AND DEXTROSE MONOHYDRATE SCH MLS/HR: 600; 310; 30; 20; 5 INJECTION, SOLUTION INTRAVENOUS at 00:54

## 2018-03-03 RX ADMIN — SODIUM CHLORIDE SCH MLS/HR: 900 INJECTION INTRAVENOUS at 11:03

## 2018-03-03 RX ADMIN — SODIUM CHLORIDE SCH MLS/HR: 900 INJECTION INTRAVENOUS at 04:44

## 2018-03-03 RX ADMIN — SODIUM CHLORIDE SCH MLS/HR: 900 INJECTION INTRAVENOUS at 23:40

## 2018-03-03 RX ADMIN — OXYCODONE HYDROCHLORIDE AND ACETAMINOPHEN PRN TAB: 10; 325 TABLET ORAL at 15:01

## 2018-03-03 RX ADMIN — SODIUM CHLORIDE, SODIUM LACTATE, POTASSIUM CHLORIDE, CALCIUM CHLORIDE, AND DEXTROSE MONOHYDRATE SCH MLS/HR: 600; 310; 30; 20; 5 INJECTION, SOLUTION INTRAVENOUS at 17:00

## 2018-03-03 RX ADMIN — SODIUM CHLORIDE, SODIUM LACTATE, POTASSIUM CHLORIDE, CALCIUM CHLORIDE, AND DEXTROSE MONOHYDRATE SCH MLS/HR: 600; 310; 30; 20; 5 INJECTION, SOLUTION INTRAVENOUS at 12:55

## 2018-03-03 RX ADMIN — SODIUM CHLORIDE, SODIUM LACTATE, POTASSIUM CHLORIDE, CALCIUM CHLORIDE, AND DEXTROSE MONOHYDRATE SCH MLS/HR: 600; 310; 30; 20; 5 INJECTION, SOLUTION INTRAVENOUS at 07:52

## 2018-03-03 RX ADMIN — SODIUM CHLORIDE, SODIUM LACTATE, POTASSIUM CHLORIDE, CALCIUM CHLORIDE, AND DEXTROSE MONOHYDRATE SCH MLS/HR: 600; 310; 30; 20; 5 INJECTION, SOLUTION INTRAVENOUS at 00:53

## 2018-03-03 RX ADMIN — OXYCODONE HYDROCHLORIDE AND ACETAMINOPHEN PRN TAB: 10; 325 TABLET ORAL at 19:40

## 2018-03-03 RX ADMIN — SODIUM CHLORIDE, SODIUM LACTATE, POTASSIUM CHLORIDE, CALCIUM CHLORIDE, AND DEXTROSE MONOHYDRATE SCH MLS/HR: 600; 310; 30; 20; 5 INJECTION, SOLUTION INTRAVENOUS at 03:22

## 2018-03-03 RX ADMIN — OXYCODONE HYDROCHLORIDE AND ACETAMINOPHEN PRN TAB: 10; 325 TABLET ORAL at 09:22

## 2018-03-03 RX ADMIN — SODIUM CHLORIDE SCH MLS/HR: 900 INJECTION INTRAVENOUS at 06:15

## 2018-03-03 RX ADMIN — SODIUM CHLORIDE, SODIUM LACTATE, POTASSIUM CHLORIDE, CALCIUM CHLORIDE, AND DEXTROSE MONOHYDRATE SCH MLS/HR: 600; 310; 30; 20; 5 INJECTION, SOLUTION INTRAVENOUS at 21:28

## 2018-03-03 RX ADMIN — SODIUM CHLORIDE SCH MLS/HR: 900 INJECTION INTRAVENOUS at 17:00

## 2018-03-03 NOTE — PROGRESS NOTE-STANDARD
Standard Progress Note


Progress Notes/Assess & Plan


Date Seen by Provider:  Mar 3, 2018


Time Seen by Provider:  09:41


Progress/Assessment & Plan


Patient complains of persistent right flank and right lower quadrant pain.  CT 

yesterday demonstrated a 6 mm calculus at the ureterovesical  conjunction.  

Patient's pain is controlled adequately with Stadol.  She denies rupture 

membranes or bleeding.  She does feel baby moving and denies contractions.








Vital Signs








  Date Time  Temp Pulse Resp B/P (MAP) Pulse Ox O2 Delivery O2 Flow Rate FiO2


 


3/2/18 04:23 97.6 92 18 108/65 (79) 100   


 


3/1/18 23:45 97.6 78 18 105/57 (73) 98   


 


3/1/18 19:35 98.8 81 18 112/71 (85) 98   


 


3/1/18 17:00 98.3 88 18 117/67 (84) 98 Room Air  


 


3/1/18 11:40 97.2 79 18 112/67 (82)  Room Air  














I & O 


 


 3/2/18





 07:00


 


Intake Total 5550 ml


 


Output Total 2150 ml


 


Balance 3400 ml





vital signs are stable patient is afebrile





Abdomen is gravid soft nontender nondistended


Extreme show clubbing cyanosis.  There is no Homans sign.





Assessment and plan   right ureteral stone on CT that appears almost into the 

bladder.  We'll continue fluids and analgesia and antibiotics and observe for 

spontaneous passage of stone.  If symptoms worsen and we will consider urology 

consult for management.  This likely would require transfer as the local 

urologist is out of town





3 218 at 1405


Patient continues complaining of right flank low back and right lower quadrant 

pain.  She has not passed a stone as of yet.  Her pain is adequately controlled 

using a milligram of Stadol about every 2 hours.  She denies any other 

complaints.  She is not nauseated and is afebrile.





I discussed treatment options with her which include changing to oral 

medication which if proves satisfactory pain control and consider allowing her 

discharge home with follow-up with urologist next week versus transfer to where 

they have immediate urologic care which is lacking progress at the moment as a 

urologist is out of town versus continued present management until Monday when 

our urologist will return.





Patient would really prefer not to have to be transferred.  She expresses 

reservations about discharge home with the degree of pain that she is having.  

So current plan is to try to change to oral medication for pain management.  We'

ll continue inpatient management through the night and reevaluate tomorrow.  

Anticipation is that she will pass the stone or have urologist intervene next 

week.  Final option would be a percutaneous nephrostomy tube but considering 

the potential complications for pregnancy waiting for urologic care for removal 

of the stone is preferable





Patient agrees with this plan








Vital Signs








  Date Time  Temp Pulse Resp B/P (MAP) Pulse Ox O2 Delivery O2 Flow Rate FiO2


 


3/2/18 08:00 97.6 75 18 112/67 (82)  Room Air  


 


3/2/18 04:23 97.6 92 18 108/65 (79) 100   


 


3/1/18 23:45 97.6 78 18 105/57 (73) 98   


 


3/1/18 19:35 98.8 81 18 112/71 (85) 98   


 


3/1/18 17:00 98.3 88 18 117/67 (84) 98 Room Air  














I & O 


 


 3/2/18





 07:00


 


Intake Total 5550 ml


 


Output Total 2150 ml


 


Balance 3400 ml





The abdomen is benign.  Abdomen is gravid and nontender.


Extreme show clubbing or cyanosis.  There is no Homans sign.





Assessment and plan   as noted above





March 3, 2018


Patient is complaining of persistent pain although it is adequately controlled 

now with oral Percocet.  There is no evidence that she has passed stone as of 

yet.  Plan is continue current management and consult Dr. Villalba on Monday if 

the stone maintenance in situ.  Patient denies rupture membranes or bleeding.  

She denies contractions.  She denies shortness of breath nausea or vomiting or 

headache.








Vital Signs








  Date Time  Temp Pulse Resp B/P (MAP) Pulse Ox O2 Delivery O2 Flow Rate FiO2


 


3/2/18 23:49 97.1 71 20 87/54 (65) 98 Room Air  


 


3/2/18 20:00 98.3 60 20 102/62 (75) 98 Room Air  


 


3/2/18 17:47 98.2 73 20 103/58 (73)    


 


3/2/18 15:13 98.2 85 20 107/59 (75)    














I & O 


 


 3/3/18





 07:00


 


Intake Total 6640 ml


 


Output Total 6200 ml


 


Balance 440 ml





Vital signs are stable.  Patient is afebrile.





The abdomen is gravid soft nontender nondistended


Extreme show clubbing cyanosis.  There is no Homans sign.





Pelvic exam is deferred











Assessment and plan   hospital day number 3 with severe right hydronephrosis 

secondary to a ureteral stone stuck at the ureterovesical junction.  With 

continuing hydration and analgesia and IV antibiotics in anticipation of 

spontaneous passage of the stone.  Should that not happen by Monday then Dr. Villalba urologist will assume care of the patient











PAULINO RESTREPO MD Mar 3, 2018 9:43 am

## 2018-03-04 VITALS — DIASTOLIC BLOOD PRESSURE: 60 MMHG | SYSTOLIC BLOOD PRESSURE: 105 MMHG

## 2018-03-04 VITALS — SYSTOLIC BLOOD PRESSURE: 106 MMHG | DIASTOLIC BLOOD PRESSURE: 65 MMHG

## 2018-03-04 RX ADMIN — SODIUM CHLORIDE, SODIUM LACTATE, POTASSIUM CHLORIDE, CALCIUM CHLORIDE, AND DEXTROSE MONOHYDRATE SCH MLS/HR: 600; 310; 30; 20; 5 INJECTION, SOLUTION INTRAVENOUS at 06:02

## 2018-03-04 RX ADMIN — SODIUM CHLORIDE, SODIUM LACTATE, POTASSIUM CHLORIDE, CALCIUM CHLORIDE, AND DEXTROSE MONOHYDRATE SCH MLS/HR: 600; 310; 30; 20; 5 INJECTION, SOLUTION INTRAVENOUS at 12:38

## 2018-03-04 RX ADMIN — SODIUM CHLORIDE, SODIUM LACTATE, POTASSIUM CHLORIDE, CALCIUM CHLORIDE, AND DEXTROSE MONOHYDRATE SCH MLS/HR: 600; 310; 30; 20; 5 INJECTION, SOLUTION INTRAVENOUS at 19:40

## 2018-03-04 RX ADMIN — SODIUM CHLORIDE SCH MLS/HR: 900 INJECTION INTRAVENOUS at 12:33

## 2018-03-04 RX ADMIN — SODIUM CHLORIDE, SODIUM LACTATE, POTASSIUM CHLORIDE, CALCIUM CHLORIDE, AND DEXTROSE MONOHYDRATE SCH MLS/HR: 600; 310; 30; 20; 5 INJECTION, SOLUTION INTRAVENOUS at 02:00

## 2018-03-04 RX ADMIN — SODIUM CHLORIDE SCH MLS/HR: 900 INJECTION INTRAVENOUS at 18:00

## 2018-03-04 RX ADMIN — OXYCODONE HYDROCHLORIDE AND ACETAMINOPHEN PRN TAB: 10; 325 TABLET ORAL at 01:19

## 2018-03-04 RX ADMIN — SODIUM CHLORIDE, SODIUM LACTATE, POTASSIUM CHLORIDE, CALCIUM CHLORIDE, AND DEXTROSE MONOHYDRATE SCH MLS/HR: 600; 310; 30; 20; 5 INJECTION, SOLUTION INTRAVENOUS at 10:20

## 2018-03-04 RX ADMIN — OXYCODONE HYDROCHLORIDE AND ACETAMINOPHEN PRN TAB: 10; 325 TABLET ORAL at 09:40

## 2018-03-04 RX ADMIN — SODIUM CHLORIDE SCH MLS/HR: 900 INJECTION INTRAVENOUS at 06:02

## 2018-03-04 RX ADMIN — OXYCODONE HYDROCHLORIDE AND ACETAMINOPHEN PRN TAB: 10; 325 TABLET ORAL at 18:33

## 2018-03-04 RX ADMIN — SODIUM CHLORIDE, SODIUM LACTATE, POTASSIUM CHLORIDE, CALCIUM CHLORIDE, AND DEXTROSE MONOHYDRATE SCH MLS/HR: 600; 310; 30; 20; 5 INJECTION, SOLUTION INTRAVENOUS at 15:00

## 2018-03-04 RX ADMIN — OXYCODONE HYDROCHLORIDE AND ACETAMINOPHEN PRN TAB: 10; 325 TABLET ORAL at 13:51

## 2018-03-04 RX ADMIN — OXYCODONE HYDROCHLORIDE AND ACETAMINOPHEN PRN TAB: 10; 325 TABLET ORAL at 05:31

## 2018-03-04 NOTE — CONSULTATION REPORT
DATE OF SERVICE:  03/04/2018



ATTENDING PHYSICIAN:

Dr. Barboza.



SUMMARY:

After reviewing the patient's records, interviewing her, examining her, and look

at her x-rays and chart, this is a 22-year-old 28 weeks' gestation pregnant on

her second baby admitted with right-sided abdominal and flank pain, was found to

have a 6 mm stone at the distal right ureter and or near the ureterovesical

junction causing hydronephrosis.  She has been having on and off pain at level

of 5 controlled by analgesics.  She has not passed the stone.  She has remained

afebrile and vital signs stable.  This is her first stone.



FAMILY HISTORY:

Negative for stones.



PERSONAL HISTORY:

The patient does not smoke, drinks or uses any illicit drugs.



PAST HISTORY:

Healthy.



MEDICATIONS:

The only medicine she is on is prenatal vitamins.



PHYSICAL EXAM:

VITAL SIGNS:  Per chart.

GENERAL:  Well-nourished, well developed in no acute distress.

HEAD:  Normocephalic.

ENT:  Unremarkable.

NECK:  Supple.  No bruits.

CHEST:  Clear.  Nontender.

HEART:  Regular rate and rhythm, no murmurs.

ABDOMEN:  Soft with a gravid uterus and mild right CVA tenderness.

EXTREMITIES:  Lower extremity, no edema or cyanosis.

NEUROLOGIC:  Grossly intact.  Oriented x3.



IMPRESSION:

Right distal ureteral stone with hydronephrosis and pain in a 28 weeks'

gestation woman.



PLAN:

Unless she passes the stone, we will take her to surgery tomorrow morning and do

ureteroscopy with stone lithotripsy, possible basket double-J stent or

lithotomy.  Procedure was fully explained to her and her partner _____ on the

risk for the baby either from anesthesia, fluoroscopy or procedure itself.  The

possibility of inability to get the stone, putting a stent was discussed, was

maybe later need to put a percutaneous nephrostomy tube or send her to another

institution.  This was also offered to her before and she elected to proceed

tomorrow.  We will monitor the baby before, during and after surgery.



Thank you for letting me to participate in the care of this patient.  We will

follow with you.





Job ID: [id number]

DocumentID: [id number]

Dictated Date:  03/04/2018 20:31:26

Transcription Date: 03/04/2018 21:00:35

Dictated By: ELIAS TAWIL, MD

## 2018-03-04 NOTE — PROGRESS NOTE-STANDARD
Standard Progress Note


Progress Notes/Assess & Plan


Date Seen by Provider:  Mar 4, 2018


Time Seen by Provider:  08:32


Progress/Assessment & Plan


Patient complains of persistent right flank and right lower quadrant pain.  CT 

yesterday demonstrated a 6 mm calculus at the ureterovesical  conjunction.  

Patient's pain is controlled adequately with Stadol.  She denies rupture 

membranes or bleeding.  She does feel baby moving and denies contractions.








Vital Signs








  Date Time  Temp Pulse Resp B/P (MAP) Pulse Ox O2 Delivery O2 Flow Rate FiO2


 


3/2/18 04:23 97.6 92 18 108/65 (79) 100   


 


3/1/18 23:45 97.6 78 18 105/57 (73) 98   


 


3/1/18 19:35 98.8 81 18 112/71 (85) 98   


 


3/1/18 17:00 98.3 88 18 117/67 (84) 98 Room Air  


 


3/1/18 11:40 97.2 79 18 112/67 (82)  Room Air  














I & O 


 


 3/2/18





 07:00


 


Intake Total 5550 ml


 


Output Total 2150 ml


 


Balance 3400 ml





vital signs are stable patient is afebrile





Abdomen is gravid soft nontender nondistended


Extreme show clubbing cyanosis.  There is no Homans sign.





Assessment and plan   right ureteral stone on CT that appears almost into the 

bladder.  We'll continue fluids and analgesia and antibiotics and observe for 

spontaneous passage of stone.  If symptoms worsen and we will consider urology 

consult for management.  This likely would require transfer as the local 

urologist is out of town





3 218 at 1405


Patient continues complaining of right flank low back and right lower quadrant 

pain.  She has not passed a stone as of yet.  Her pain is adequately controlled 

using a milligram of Stadol about every 2 hours.  She denies any other 

complaints.  She is not nauseated and is afebrile.





I discussed treatment options with her which include changing to oral 

medication which if proves satisfactory pain control and consider allowing her 

discharge home with follow-up with urologist next week versus transfer to where 

they have immediate urologic care which is lacking progress at the moment as a 

urologist is out of town versus continued present management until Monday when 

our urologist will return.





Patient would really prefer not to have to be transferred.  She expresses 

reservations about discharge home with the degree of pain that she is having.  

So current plan is to try to change to oral medication for pain management.  We'

ll continue inpatient management through the night and reevaluate tomorrow.  

Anticipation is that she will pass the stone or have urologist intervene next 

week.  Final option would be a percutaneous nephrostomy tube but considering 

the potential complications for pregnancy waiting for urologic care for removal 

of the stone is preferable





Patient agrees with this plan








Vital Signs








  Date Time  Temp Pulse Resp B/P (MAP) Pulse Ox O2 Delivery O2 Flow Rate FiO2


 


3/2/18 08:00 97.6 75 18 112/67 (82)  Room Air  


 


3/2/18 04:23 97.6 92 18 108/65 (79) 100   


 


3/1/18 23:45 97.6 78 18 105/57 (73) 98   


 


3/1/18 19:35 98.8 81 18 112/71 (85) 98   


 


3/1/18 17:00 98.3 88 18 117/67 (84) 98 Room Air  














I & O 


 


 3/2/18





 07:00


 


Intake Total 5550 ml


 


Output Total 2150 ml


 


Balance 3400 ml





The abdomen is benign.  Abdomen is gravid and nontender.


Extreme show clubbing or cyanosis.  There is no Homans sign.





Assessment and plan   as noted above





March 3, 2018


Patient is complaining of persistent pain although it is adequately controlled 

now with oral Percocet.  There is no evidence that she has passed stone as of 

yet.  Plan is continue current management and consult Dr. Villalba on Monday if 

the stone maintenance in situ.  Patient denies rupture membranes or bleeding.  

She denies contractions.  She denies shortness of breath nausea or vomiting or 

headache.








Vital Signs








  Date Time  Temp Pulse Resp B/P (MAP) Pulse Ox O2 Delivery O2 Flow Rate FiO2


 


3/2/18 23:49 97.1 71 20 87/54 (65) 98 Room Air  


 


3/2/18 20:00 98.3 60 20 102/62 (75) 98 Room Air  


 


3/2/18 17:47 98.2 73 20 103/58 (73)    


 


3/2/18 15:13 98.2 85 20 107/59 (75)    














I & O 


 


 3/3/18





 07:00


 


Intake Total 6640 ml


 


Output Total 6200 ml


 


Balance 440 ml





Vital signs are stable.  Patient is afebrile.





The abdomen is gravid soft nontender nondistended


Extreme show clubbing cyanosis.  There is no Homans sign.





Pelvic exam is deferred











Assessment and plan   hospital day number 3 with severe right hydronephrosis 

secondary to a ureteral stone stuck at the ureterovesical junction.  With 

continuing hydration and analgesia and IV antibiotics in anticipation of 

spontaneous passage of the stone.  Should that not happen by Monday then Dr. Villalba urologist will assume care of the patient





March 4, 2018


Patient is without complaint except for the continued right flank side and 

lower abdomen pain.  She does ambulating.  She is tolerating by mouth well.  

She denies contractions, denies shortness breath, denies nausea vomiting, and 

denies headache.








Vital Signs








  Date Time  Temp Pulse Resp B/P (MAP) Pulse Ox O2 Delivery O2 Flow Rate FiO2


 


3/3/18 23:40 97.9 61 18 111/61 (78) 100 Room Air  


 


3/3/18 16:04 98.4 87 18 104/67 (79) 98 Room Air  


 


3/3/18 09:10 98.2 75 18 115/70 (85) 100 Room Air  














I & O 


 


 3/4/18





 07:00


 


Intake Total 9020 ml


 


Output Total 6625 ml


 


Balance 2395 ml





Vital signs are stable.  Patient is afebrile.





Fetal monitor shows normal fetal heart rate pattern with no contractions.





Physical exam is benign.  The abdomen is gravid and nontender. 


Extreme show no clubbing or cyanosis.  There is no Homans sign.





Assessment and plan   hospital day 3 with a right ureteral stone and severe 

renal colic.  IV fluids IV analgesia and IV antibiotics continue.  If the stone 

is not passed spontaneously then Dr. Villalba will be available to consult on the 

patient











PAULINO RESTREPO MD Mar 4, 2018 8:34 am

## 2018-03-05 VITALS — DIASTOLIC BLOOD PRESSURE: 59 MMHG | SYSTOLIC BLOOD PRESSURE: 106 MMHG

## 2018-03-05 VITALS — SYSTOLIC BLOOD PRESSURE: 114 MMHG | DIASTOLIC BLOOD PRESSURE: 62 MMHG

## 2018-03-05 VITALS — SYSTOLIC BLOOD PRESSURE: 117 MMHG | DIASTOLIC BLOOD PRESSURE: 76 MMHG

## 2018-03-05 PROCEDURE — BT1D0ZZ FLUOROSCOPY OF RIGHT KIDNEY, URETER AND BLADDER USING HIGH OSMOLAR CONTRAST: ICD-10-PCS | Performed by: UROLOGY

## 2018-03-05 PROCEDURE — 0TJ98ZZ INSPECTION OF URETER, VIA NATURAL OR ARTIFICIAL OPENING ENDOSCOPIC: ICD-10-PCS | Performed by: UROLOGY

## 2018-03-05 RX ADMIN — SODIUM CHLORIDE, SODIUM LACTATE, POTASSIUM CHLORIDE, CALCIUM CHLORIDE, AND DEXTROSE MONOHYDRATE SCH MLS/HR: 600; 310; 30; 20; 5 INJECTION, SOLUTION INTRAVENOUS at 00:47

## 2018-03-05 RX ADMIN — SODIUM CHLORIDE SCH MLS/HR: 900 INJECTION INTRAVENOUS at 06:41

## 2018-03-05 RX ADMIN — OXYCODONE HYDROCHLORIDE AND ACETAMINOPHEN PRN TAB: 10; 325 TABLET ORAL at 00:00

## 2018-03-05 RX ADMIN — SODIUM CHLORIDE, SODIUM LACTATE, POTASSIUM CHLORIDE, CALCIUM CHLORIDE, AND DEXTROSE MONOHYDRATE SCH MLS/HR: 600; 310; 30; 20; 5 INJECTION, SOLUTION INTRAVENOUS at 04:40

## 2018-03-05 RX ADMIN — SODIUM CHLORIDE SCH MLS/HR: 900 INJECTION INTRAVENOUS at 00:00

## 2018-03-05 NOTE — DIAGNOSTIC IMAGING REPORT
INDICATION: 

Fluoroscopy during right-sided ureteroscopy.



FINDINGS:

Fluoroscopy was provided during performance of a right-sided

ureteroscopy. 10 seconds of fluoroscopic time was utilized.

Images demonstrate injection of a small amount of contrast into

the distal right ureter. No filling defects are seen.



IMPRESSION: 

Fluoroscopy during performance of a ureteroscope.



Dictated by: 



  Dictated on workstation # [id number]

## 2018-03-05 NOTE — DISCHARGE INSTRUCTIONS
Discharge Instructions


Discharge Medications


New, Converted or Re-Newed RX:  RX on Chart





Patient Instructions


Patient Instructions:  


As directed


Return to [facility] For:  


As directed





Activity & Diet


Discharge Diet:  No Restrictions


Activity as Tolerated:  Yes





Orders-Post D/C & Referrals


Return to clinic as scheduled for return of the





Follow-up with Dr. Jackson's instructions





Continue prenatal vitamins





Continue antibiotics after discharge only if prescribed by Dr. TaWill HIGGINBOTHAM,DENNIS G MD Mar 5, 2018 8:00 am

## 2018-03-05 NOTE — ANESTHESIA-REGIONAL POST-OP
Regional


Patient Condition


Mental Status:  Alert, Oriented x3


Circulation:  Same as Pre-Op


Headache:  Absent


Sensation:  Full Recovery


Motor Block:  Absent





Post Op Complications


Complications


None





Follow Up Care/Instructions


Patient Instructions


None needed.





Anesthesia/Patient Condition


Patient was doing well after surgery and prior to discharge to home, no 

complaints, stable vital signs, no apparent adverse anesthesia problems.











TORI CABRERA DO Mar 5, 2018 14:25

## 2018-03-05 NOTE — PROGRESS NOTE-POST OPERATIVE
Post-Operative Progess Note


Surgeon (s)/Assistant (s)


Surgeon


ELIAS TAWIL MD


Assistant:  N/A





Pre-Operative Diagnosis


RT DISTAL URETERAL STONE





Post-Operative Diagnosis





SAME





Procedure & Operative Findings


Date of Procedure


3/5/18


Procedure Performed/Findings


RT URETEROSCOPY WITH RETROGRADE UROGRAM


Anesthesia Type


SPINAL





Estimated Blood Loss


Estimated blood loss (mL):  N/A





Specimens/Packing


Specimens Removed


N/A


Packing:  


N/A











TAWIL,ELIAS A MD Mar 5, 2018 8:33 am

## 2018-03-05 NOTE — PROGRESS NOTE-STANDARD
Standard Progress Note


Progress Notes/Assess & Plan


Date Seen by Provider:  Mar 5, 2018


Time Seen by Provider:  07:55


Progress/Assessment & Plan


Patient complains of persistent right flank and right lower quadrant pain.  CT 

yesterday demonstrated a 6 mm calculus at the ureterovesical  conjunction.  

Patient's pain is controlled adequately with Stadol.  She denies rupture 

membranes or bleeding.  She does feel baby moving and denies contractions.








Vital Signs








  Date Time  Temp Pulse Resp B/P (MAP) Pulse Ox O2 Delivery O2 Flow Rate FiO2


 


3/2/18 04:23 97.6 92 18 108/65 (79) 100   


 


3/1/18 23:45 97.6 78 18 105/57 (73) 98   


 


3/1/18 19:35 98.8 81 18 112/71 (85) 98   


 


3/1/18 17:00 98.3 88 18 117/67 (84) 98 Room Air  


 


3/1/18 11:40 97.2 79 18 112/67 (82)  Room Air  














I & O 


 


 3/2/18





 07:00


 


Intake Total 5550 ml


 


Output Total 2150 ml


 


Balance 3400 ml





vital signs are stable patient is afebrile





Abdomen is gravid soft nontender nondistended


Extreme show clubbing cyanosis.  There is no Homans sign.





Assessment and plan   right ureteral stone on CT that appears almost into the 

bladder.  We'll continue fluids and analgesia and antibiotics and observe for 

spontaneous passage of stone.  If symptoms worsen and we will consider urology 

consult for management.  This likely would require transfer as the local 

urologist is out of town





[phone] at [phone]


Patient continues complaining of right flank low back and right lower quadrant 

pain.  She has not passed a stone as of yet.  Her pain is adequately controlled 

using a milligram of Stadol about every 2 hours.  She denies any other 

complaints.  She is not nauseated and is afebrile.





I discussed treatment options with her which include changing to oral 

medication which if proves satisfactory pain control and consider allowing her 

discharge home with follow-up with urologist next week versus transfer to where 

they have immediate urologic care which is lacking progress at the moment as a 

urologist is out of town versus continued present management until Monday when 

our urologist will return.





Patient would really prefer not to have to be transferred.  She expresses 

reservations about discharge home with the degree of pain that she is having.  

So current plan is to try to change to oral medication for pain management.  We'

ll continue inpatient management through the night and reevaluate tomorrow.  

Anticipation is that she will pass the stone or have urologist intervene next 

week.  Final option would be a percutaneous nephrostomy tube but considering 

the potential complications for pregnancy waiting for urologic care for removal 

of the stone is preferable





Patient agrees with this plan








Vital Signs








  Date Time  Temp Pulse Resp B/P (MAP) Pulse Ox O2 Delivery O2 Flow Rate FiO2


 


3/2/18 08:00 97.6 75 18 112/67 (82)  Room Air  


 


3/2/18 04:23 97.6 92 18 108/65 (79) 100   


 


3/1/18 23:45 97.6 78 18 105/57 (73) 98   


 


3/1/18 19:35 98.8 81 18 112/71 (85) 98   


 


3/1/18 17:00 98.3 88 18 117/67 (84) 98 Room Air  














I & O 


 


 3/2/18





 07:00


 


Intake Total 5550 ml


 


Output Total 2150 ml


 


Balance 3400 ml





The abdomen is benign.  Abdomen is gravid and nontender.


Extreme show clubbing or cyanosis.  There is no Homans sign.





Assessment and plan   as noted above





March 3, 2018


Patient is complaining of persistent pain although it is adequately controlled 

now with oral Percocet.  There is no evidence that she has passed stone as of 

yet.  Plan is continue current management and consult Dr. Villalba on Monday if 

the stone maintenance in situ.  Patient denies rupture membranes or bleeding.  

She denies contractions.  She denies shortness of breath nausea or vomiting or 

headache.








Vital Signs








  Date Time  Temp Pulse Resp B/P (MAP) Pulse Ox O2 Delivery O2 Flow Rate FiO2


 


3/2/18 23:49 97.1 71 20 87/54 (65) 98 Room Air  


 


3/2/18 20:00 98.3 60 20 102/62 (75) 98 Room Air  


 


3/2/18 17:47 98.2 73 20 103/58 (73)    


 


3/2/18 15:13 98.2 85 20 107/59 (75)    














I & O 


 


 3/3/18





 07:00


 


Intake Total 6640 ml


 


Output Total 6200 ml


 


Balance 440 ml





Vital signs are stable.  Patient is afebrile.





The abdomen is gravid soft nontender nondistended


Extreme show clubbing cyanosis.  There is no Homans sign.





Pelvic exam is deferred











Assessment and plan   hospital day number 3 with severe right hydronephrosis 

secondary to a ureteral stone stuck at the ureterovesical junction.  With 

continuing hydration and analgesia and IV antibiotics in anticipation of 

spontaneous passage of the stone.  Should that not happen by Monday then Dr. Villalba urologist will assume care of the patient





March 4, 2018


Patient is without complaint except for the continued right flank side and 

lower abdomen pain.  She does ambulating.  She is tolerating by mouth well.  

She denies contractions, denies shortness breath, denies nausea vomiting, and 

denies headache.








Vital Signs








  Date Time  Temp Pulse Resp B/P (MAP) Pulse Ox O2 Delivery O2 Flow Rate FiO2


 


3/3/18 23:40 97.9 61 18 111/61 (78) 100 Room Air  


 


3/3/18 16:04 98.4 87 18 104/67 (79) 98 Room Air  


 


3/3/18 09:10 98.2 75 18 115/70 (85) 100 Room Air  














I & O 


 


 3/4/18





 07:00


 


Intake Total 9020 ml


 


Output Total 6625 ml


 


Balance 2395 ml





Vital signs are stable.  Patient is afebrile.





Fetal monitor shows normal fetal heart rate pattern with no contractions.





Physical exam is benign.  The abdomen is gravid and nontender. 


Extreme show no clubbing or cyanosis.  There is no Homans sign.





Assessment and plan   hospital day 3 with a right ureteral stone and severe 

renal colic.  IV fluids IV analgesia and IV antibiotics continue.  If the stone 

is not passed spontaneously then Dr. Villalba will be available to consult on the 

patient





March 5, 2018


Patient is without complaint other than her continued right flank and right 

lower quadrant and pelvic pain.  She has not as of yet passed a stone.  She is 

ambulating, voiding,


And tolerating by mouth.  She denies contractions.  Denies rupture membranes.  

She does have leg movement.





Dr. Villalba has seen this patient and plans operative management for her stone.








Vital Signs








  Date Time  Temp Pulse Resp B/P (MAP) Pulse Ox O2 Delivery O2 Flow Rate FiO2


 


3/5/18 00:00 98.7 77 18 106/59 (75) 97 Room Air  


 


3/4/18 16:10 98.5 81 18 105/60 (75) 97 Room Air  


 


3/4/18 09:38 98.2 77 18 106/65 (79) 99 Room Air  














I & O 


 


 3/5/18





 07:00


 


Intake Total 7920 ml


 


Output Total 7475 ml


 


Balance 445 ml





Elise stable.  Patient afebrile.





Abdomen is benign.  Patient is gravid.


Extremities show no clubbing cyanosis.  There is no Homans sign.





Assessment and plan      hospital day number 4 secondary to renal colic due to 

obstructed right ureter due to nephrolithiasis.  Surgical management minute the 

hands of Dr. Villalba is pending.  An will be discharged home should her pain be 

completely resolved.


Final Diagnosis


Nephrolithiasis











PAULINO RESTREPO MD Mar 5, 2018 7:57 am

## 2018-03-05 NOTE — PROGRESS NOTE-PRE OPERATIVE
Pre-Operative Progress Note


H&P Reviewed


The H&P was reviewed, patient examined and no changes noted.


Date Seen by Provider:  Mar 5, 2018


Time Seen by Provider:  08:32


Date H&P Reviewed:  Mar 5, 2018


Time H&P Reviewed:  08:32


Pre-Operative Diagnosis:  RT DISTAL URETERAL STONE











TAWIL,ELIAS A MD Mar 5, 2018 8:32 am

## 2018-03-05 NOTE — OPERATIVE REPORT
DATE OF SERVICE:  03/05/2018



PREOPERATIVE DIAGNOSIS:

Right distal ureteral stone.



POSTOPERATIVE DIAGNOSIS:

Right distal ureteral stone.



OPERATION PERFORMED:

Right ureteroscopy and retrograde urogram.



SURGEON:

Elias Tawil, MD



ANESTHESIA:

Spinal.



COMPLICATIONS:

None.



DESCRIPTION OF PROCEDURE:

Under satisfactory spinal anesthesia, the patient in lithotomy position,

genitalia were prepped and draped in usual sterile fashion.  There was a little

distal urethral stenosis that responded to the scope.  A cystoscopy was

performed and the only abnormality was the swelling and edema and redness of the

intramural portion of the right ureter with sluggish efflux.  The left side was

clear.  Using the foroblique lens, I dilated right ureteral orifice intramural

portion with no problem to accommodate a 6.9-Yi semi-rigid ureteroscope, I

went up all the way to the renal pelvis up and down.  I did not see any stone. 

The only thing I could see is changes in the intramural portion from the

presence of a stone that must have passed.  I removed the ureteroscope,

reinserted the cystoscope and performed a retrograde urogram that shows no

filling defect, no dilatation of the ureter and complete emptying of the ureter

on withdrawing the urethral catheter.  I emptied the bladder, removed the

cystoscope, performed again another ureteroscopy all the way up to the kidney up

and down, antegrade and retrograde, there was no stone.  The patient tolerated

the procedure and anesthesia well and was sent to recovery room in stable

condition.





Job ID: [id number]

DocumentID: [id number]

Dictated Date:  03/05/2018 09:31:18

Transcription Date: 03/05/2018 16:01:57

Dictated By: ELIAS TAWIL, MD

## 2018-03-09 ENCOUNTER — HOSPITAL ENCOUNTER (EMERGENCY)
Dept: HOSPITAL 75 - ER | Age: 23
Discharge: HOME | End: 2018-03-09
Payer: COMMERCIAL

## 2018-03-09 VITALS — SYSTOLIC BLOOD PRESSURE: 115 MMHG | DIASTOLIC BLOOD PRESSURE: 74 MMHG

## 2018-03-09 VITALS — BODY MASS INDEX: 26.16 KG/M2 | WEIGHT: 157 LBS | HEIGHT: 65 IN

## 2018-03-09 DIAGNOSIS — R51: ICD-10-CM

## 2018-03-09 DIAGNOSIS — Z3A.29: ICD-10-CM

## 2018-03-09 DIAGNOSIS — K21.9: ICD-10-CM

## 2018-03-09 DIAGNOSIS — O23.43: Primary | ICD-10-CM

## 2018-03-09 DIAGNOSIS — O99.613: ICD-10-CM

## 2018-03-09 DIAGNOSIS — O99.89: ICD-10-CM

## 2018-03-09 LAB
ALBUMIN SERPL-MCNC: 3.5 GM/DL (ref 3.2–4.5)
ALP SERPL-CCNC: 63 U/L (ref 40–136)
ALT SERPL-CCNC: 19 U/L (ref 0–55)
APTT PPP: YELLOW S
BACTERIA #/AREA URNS HPF: (no result) /HPF
BASOPHILS # BLD AUTO: 0 10^3/UL (ref 0–0.1)
BASOPHILS NFR BLD AUTO: 0 % (ref 0–10)
BILIRUB SERPL-MCNC: 0.8 MG/DL (ref 0.1–1)
BILIRUB UR QL STRIP: NEGATIVE
BUN/CREAT SERPL: 19
CALCIUM SERPL-MCNC: 8.5 MG/DL (ref 8.5–10.1)
CHLORIDE SERPL-SCNC: 109 MMOL/L (ref 98–107)
CO2 SERPL-SCNC: 21 MMOL/L (ref 21–32)
CREAT SERPL-MCNC: 0.58 MG/DL (ref 0.6–1.3)
EOSINOPHIL # BLD AUTO: 0.1 10^3/UL (ref 0–0.3)
EOSINOPHIL NFR BLD AUTO: 1 % (ref 0–10)
ERYTHROCYTE [DISTWIDTH] IN BLOOD BY AUTOMATED COUNT: 12.9 % (ref 10–14.5)
FIBRINOGEN PPP-MCNC: CLEAR MG/DL
GFR SERPLBLD BASED ON 1.73 SQ M-ARVRAT: > 60 ML/MIN
GLUCOSE SERPL-MCNC: 76 MG/DL (ref 70–105)
GLUCOSE UR STRIP-MCNC: NEGATIVE MG/DL
HCT VFR BLD CALC: 32 % (ref 35–52)
HGB BLD-MCNC: 11.5 G/DL (ref 11.5–16)
KETONES UR QL STRIP: NEGATIVE
LEUKOCYTE ESTERASE UR QL STRIP: (no result)
LYMPHOCYTES # BLD AUTO: 2.4 X 10^3 (ref 1–4)
LYMPHOCYTES NFR BLD AUTO: 25 % (ref 12–44)
MANUAL DIFFERENTIAL PERFORMED BLD QL: NO
MCH RBC QN AUTO: 32 PG (ref 25–34)
MCHC RBC AUTO-ENTMCNC: 36 G/DL (ref 32–36)
MCV RBC AUTO: 90 FL (ref 80–99)
MONOCYTES # BLD AUTO: 0.5 X 10^3 (ref 0–1)
MONOCYTES NFR BLD AUTO: 6 % (ref 0–12)
NEUTROPHILS # BLD AUTO: 6.5 X 10^3 (ref 1.8–7.8)
NEUTROPHILS NFR BLD AUTO: 68 % (ref 42–75)
NITRITE UR QL STRIP: NEGATIVE
PH UR STRIP: 5 [PH] (ref 5–9)
PLATELET # BLD: 205 10^3/UL (ref 130–400)
PMV BLD AUTO: 9.8 FL (ref 7.4–10.4)
POTASSIUM SERPL-SCNC: 3.6 MMOL/L (ref 3.6–5)
PROT SERPL-MCNC: 6.7 GM/DL (ref 6.4–8.2)
PROT UR QL STRIP: (no result)
RBC # BLD AUTO: 3.59 10^6/UL (ref 4.35–5.85)
RBC #/AREA URNS HPF: (no result) /HPF
SODIUM SERPL-SCNC: 136 MMOL/L (ref 135–145)
SP GR UR STRIP: 1.02 (ref 1.02–1.02)
SQUAMOUS #/AREA URNS HPF: >50 /HPF
UROBILINOGEN UR-MCNC: NORMAL MG/DL
WBC # BLD AUTO: 9.5 10^3/UL (ref 4.3–11)
WBC #/AREA URNS HPF: (no result) /HPF

## 2018-03-09 PROCEDURE — 81000 URINALYSIS NONAUTO W/SCOPE: CPT

## 2018-03-09 PROCEDURE — 80053 COMPREHEN METABOLIC PANEL: CPT

## 2018-03-09 PROCEDURE — 87088 URINE BACTERIA CULTURE: CPT

## 2018-03-09 PROCEDURE — 36415 COLL VENOUS BLD VENIPUNCTURE: CPT

## 2018-03-09 PROCEDURE — 85025 COMPLETE CBC W/AUTO DIFF WBC: CPT

## 2018-03-09 RX ADMIN — BUTALBITAL, ACETAMINOPHEN, AND CAFFEINE PRN TAB: 50; 325; 40 TABLET ORAL at 11:22

## 2018-03-09 RX ADMIN — BUTALBITAL, ACETAMINOPHEN, AND CAFFEINE PRN TAB: 50; 325; 40 TABLET ORAL at 11:25

## 2018-03-09 NOTE — ANESTHESIA-PROCEDURE NOTE
Procedure Start/Stop Time


Date of Procedure:  Mar 9, 2018


Start Time:  11:25


Stop Time:  11:30





Procedures/Interventions


Procedures


Called to ER to evaluate patient for epidural blood patch due to complaints of 

headache while upright. Pt sitting up on ER cart upon arrival, no apparent 

distress upon assessment. Spoke with CRNA who did pt's SAB on Monday. 25G 

Pencan used x 1 attempt with no issues. Discussed blood patch and additional 

risks involved with procedure. Encouraged patient to continue with conservative 

measures and to follow up with us if continued symptoms, as she is now 5 days 

post surgical. Informed patient that we will be available at any time for 

further evaluation. Pt in agreement.











CORRINE TYSON CRNA Mar 9, 2018 11:40

## 2018-03-09 NOTE — ED HEADACHE
General


Stated Complaint:  BLOOD PATCH


Source:  patient


Exam Limitations:  no limitations





History of Present Illness


Date Seen by Provider:  Mar 9, 2018


Time Seen by Provider:  10:58


Initial Comments


To ER with a global headache since Monday afternoon. Headache is worse when she 

is upright, better when she is laying flat. However it does not completely go 

away when she lies flat. She is 29 weeks gestation. On Monday of this week just 

before the headache began she had a spinal anesthesia done for ureteroscopy by 

Dr. Tawil here. She has been taking Tylenol at home without relief.


Timing/Duration:  1 week


Severity/Quality:  moderate


Location:  global


Associated Symptoms:  denies symptoms





Allergies and Home Medications


Allergies


Coded Allergies:  


     No Known Drug Allergies (Unverified , 1/22/16)





Home Medications


Butalb/Acetaminophen/Caffeine 1 Each Capsule, 1 EACH PO PRN


   Prescribed by: NITO NAM on 3/9/18 1202


Cefuroxime Axetil 250 Mg Tablet, 250 MG PO BID


   Prescribed by: NITO NAM on 3/9/18 1202





Patient Home Medication List


Home Medication List Reviewed:  Yes





Constitutional:  see HPI


Eyes:  No Symptoms Reported


Ears, Nose, Mouth, Throat:  no symptoms reported


Respiratory:  no symptoms reported


Cardiovascular:  no symptoms reported


Genitourinary:  no symptoms reported


Musculoskeletal:  see HPI


Skin:  no symptoms reported


Psychiatric/Neurological:  No Symptoms Reported





Past Medical-Social-Family Hx


Patient Social History


Recent Foreign Travel:  No


Contact w/Someone Who Travel:  No





Immunizations Up To Date


Tetanus Booster (TDap):  Unknown


PED Vaccines UTD:  No





Seasonal Allergies


Seasonal Allergies:  No





Surgeries


History of Surgeries:  Yes (left knee)





Respiratory


History of Respiratory Disorde:  No





Cardiovascular


History of Cardiac Disorders:  No





Neurological


History of Neurological Disord:  No





Reproductive System


Hx Reproductive Disorders:  No


Female Reproductive Disorders:  Denies





Gastrointestinal


History of Gastrointestinal Di:  Yes


Gastrointestinal Disorders:  Gastroesophageal Reflux





Musculoskeletal


History of Musculoskeletal Dis:  No





Endocrine


History of Endocrine Disorders:  No





Cancer


History of Cancer:  No





Psychosocial


History of Psychiatric Problem:  No





Integumentary


History of Skin or Integumenta:  No





Blood Transfusions


History of Blood Disorders:  No





Family Medical History


Significant Family History:  No Pertinent Family Hx


Family Medial History:  


Alzheimer's disease


  19 MOTHER (grandfather)


Completed stroke


  19 FATHER (grandfather)


Diabetes mellitus


  19 FATHER (grandparent)


  19 MOTHER (maternal grandparent)


Hypertension


  19 MOTHER (mother)


Non-Hodgkin's lymphoma


Respiratory disorder


  19 MOTHER (copd grandmother)





Physical Exam


Vital Signs





Vital Signs - First Documented








 3/9/18





 10:50


 


Temp 97.0


 


Pulse 83


 


Resp 18


 


B/P (MAP) 115/74 (88)


 


Pulse Ox 100





Capillary Refill :


General Appearance:  WD/WN, no apparent distress


HEENT:  PERRL/EOMI, normal ENT inspection


Neck:  non-tender, full range of motion


Respiratory:  no respiratory distress, no accessory muscle use


Gastrointestinal:  normal bowel sounds, non tender


Extremities:  normal range of motion, non-tender


Psychiatric:  alert, oriented x 3


Crainal Nerves:  normal hearing, normal speech, PERRL


Motor/Sensory:  no motor deficit, no sensory deficit


Skin:  normal color, warm/dry





Progress/Results/Core Measures


Results/Orders


Lab Results





Laboratory Tests








Test


  3/9/18


10:55 3/9/18


11:10 Range/Units


 


 


Urine Color YELLOW    


 


Urine Clarity CLEAR    


 


Urine pH 5   5-9  


 


Urine Specific Gravity 1.020   1.016-1.022  


 


Urine Protein 1+ H  NEGATIVE  


 


Urine Glucose (UA) NEGATIVE   NEGATIVE  


 


Urine Ketones NEGATIVE   NEGATIVE  


 


Urine Nitrite NEGATIVE   NEGATIVE  


 


Urine Bilirubin NEGATIVE   NEGATIVE  


 


Urine Urobilinogen NORMAL   NORMAL  MG/DL


 


Urine Leukocyte Esterase 3+ H  NEGATIVE  


 


Urine RBC (Auto) 3+ H  NEGATIVE  


 


Urine RBC 5-10 H   /HPF


 


Urine WBC 25-50 H   /HPF


 


Urine Squamous Epithelial


Cells >50 H


  


   /HPF


 


 


Urine Crystals NONE    /LPF


 


Urine Bacteria LARGE H   /HPF


 


Urine Casts NONE    /LPF


 


Urine Mucus LARGE H   /LPF


 


Urine Culture Indicated YES    


 


White Blood Count


  


  9.5 


  4.3-11.0


10^3/uL


 


Red Blood Count


  


  3.59 L


  4.35-5.85


10^6/uL


 


Hemoglobin  11.5  11.5-16.0  G/DL


 


Hematocrit  32 L 35-52  %


 


Mean Corpuscular Volume  90  80-99  FL


 


Mean Corpuscular Hemoglobin  32  25-34  PG


 


Mean Corpuscular Hemoglobin


Concent 


  36 


  32-36  G/DL


 


 


Red Cell Distribution Width  12.9  10.0-14.5  %


 


Platelet Count


  


  205 


  130-400


10^3/uL


 


Mean Platelet Volume  9.8  7.4-10.4  FL


 


Neutrophils (%) (Auto)  68  42-75  %


 


Lymphocytes (%) (Auto)  25  12-44  %


 


Monocytes (%) (Auto)  6  0-12  %


 


Eosinophils (%) (Auto)  1  0-10  %


 


Basophils (%) (Auto)  0  0-10  %


 


Neutrophils # (Auto)  6.5  1.8-7.8  X 10^3


 


Lymphocytes # (Auto)  2.4  1.0-4.0  X 10^3


 


Monocytes # (Auto)  0.5  0.0-1.0  X 10^3


 


Eosinophils # (Auto)


  


  0.1 


  0.0-0.3


10^3/uL


 


Basophils # (Auto)


  


  0.0 


  0.0-0.1


10^3/uL


 


Sodium Level  136  135-145  MMOL/L


 


Potassium Level  3.6  3.6-5.0  MMOL/L


 


Chloride Level  109 H   MMOL/L


 


Carbon Dioxide Level  21  21-32  MMOL/L


 


Anion Gap  6  5-14  MMOL/L


 


Blood Urea Nitrogen  11  7-18  MG/DL


 


Creatinine


  


  0.58 L


  0.60-1.30


MG/DL


 


Estimat Glomerular Filtration


Rate 


  > 60 


   


 


 


BUN/Creatinine Ratio  19   


 


Glucose Level  76    MG/DL


 


Calcium Level  8.5  8.5-10.1  MG/DL


 


Total Bilirubin  0.8  0.1-1.0  MG/DL


 


Aspartate Amino Transf


(AST/SGOT) 


  13 


  5-34  U/L


 


 


Alanine Aminotransferase


(ALT/SGPT) 


  19 


  0-55  U/L


 


 


Alkaline Phosphatase  63    U/L


 


Total Protein  6.7  6.4-8.2  GM/DL


 


Albumin  3.5  3.2-4.5  GM/DL








Micro Results





Microbiology


3/9/18 Urine Culture - Final, Complete


         





My Orders





Orders - NITO NAM


Saline Lock/Iv-Start (3/9/18 11:02)


Cbc With Automated Diff (3/9/18 11:02)


Comprehensive Metabolic Panel (3/9/18 11:02)


Ua Culture If Indicated (3/9/18 11:02)


Ns Iv 1000 Ml (Sodium Chloride 0.9%) (3/9/18 11:15)


Butalbital/Apap/Caffeine Tab (Fioricet T (3/9/18 11:15)


Urine Culture (3/9/18 10:55)


Ceftriaxone Injection (Rocephin Injectio (3/9/18 11:45)





Medications Given in ED





Vital Signs/I&O





Vital Sign - Last 12Hours








 3/9/18 3/9/18





 10:50 12:12


 


Temp 97.0 97.0


 


Pulse 83 83


 


Resp 18 18


 


B/P (MAP) 115/74 (88) 115/74 (88)


 


Pulse Ox 100 100








Progress Note :  


   Time:  11:18


Progress Note


Rylee Romo CRNA here to see patient at this time. She she stated that the 

timing of the epidural and the headache is too far out and blood patch would 

likely not improve symptoms. She stated that conservative therapy would be the 

best option.





1145- Patient's headache has improved after the Fioricet. Patient has been 

informed of planes for discharge and her diagnosis of a urinary tract infection.





Departure


Impression


Impression:  


 Primary Impression:  


 UTI (urinary tract infection)


 Additional Impression:  


 Headache


Disposition:  01 HOME, SELF-CARE


Condition:  Stable/Unchanged





Departure-Patient Inst.


Referrals:  


LIZZY MCPHERSON MD (PCP/Family)


Primary Care Physician


Patient Instructions:  Headache, Adult (DC), Urinary Tract Infection, Adult (DC)





Add. Discharge Instructions:  


Take medications as directed, returning back here for worsening headaches, 

nausea, vomiting. Follow-up with your primary care physician and your OB/GYN 

within a week.


Scripts


Cefuroxime Axetil (Cefuroxime) 250 Mg Tablet


250 MG PO BID for 7 Days, #14 TAB


   Prov: NITO NAM         3/9/18 


Butalb/Acetaminophen/Caffeine (Zgdnbj-Ttkkltkq-Mhyu -40) 1 Each Capsule


1 EACH PO PRN, #10 CAP


   Prov: NITO NAM         3/9/18











NITO NAM Mar 9, 2018 10:59

## 2018-04-11 ENCOUNTER — HOSPITAL ENCOUNTER (OUTPATIENT)
Dept: HOSPITAL 75 - WSO | Age: 23
Discharge: HOME | End: 2018-04-11
Attending: OBSTETRICS & GYNECOLOGY
Payer: COMMERCIAL

## 2018-04-11 VITALS — SYSTOLIC BLOOD PRESSURE: 108 MMHG | DIASTOLIC BLOOD PRESSURE: 63 MMHG

## 2018-04-11 VITALS — BODY MASS INDEX: 27.32 KG/M2 | WEIGHT: 164 LBS | HEIGHT: 65 IN

## 2018-04-11 DIAGNOSIS — Z3A.39: ICD-10-CM

## 2018-04-11 DIAGNOSIS — O36.8130: Primary | ICD-10-CM

## 2018-04-11 PROCEDURE — 99212 OFFICE O/P EST SF 10 MIN: CPT

## 2018-04-12 NOTE — PHYSICIAN QUERY-FINAL DX
BRIAN RODRIGUEZ 4/12/18 1656:


Clinic Account Progress/Dx


Physician Query:


Please give diagnosis


Date of Service





Apr 11, 2018 at 13:48





PAULINO RESTREPO MD 4/13/18 0753:


Clinic Account Progress/Dx


DIAGNOSIS:


Diagnosis


Decreased fetal movement











BRIAN RODRIGUEZ Apr 12, 2018 16:56


PAULINO RESTREPO MD Apr 13, 2018 07:53

## 2018-04-24 ENCOUNTER — HOSPITAL ENCOUNTER (OUTPATIENT)
Dept: HOSPITAL 75 - WSO | Age: 23
Discharge: HOME | End: 2018-04-24
Attending: OBSTETRICS & GYNECOLOGY
Payer: COMMERCIAL

## 2018-04-24 VITALS
DIASTOLIC BLOOD PRESSURE: 76 MMHG | HEIGHT: 65 IN | WEIGHT: 167 LBS | BODY MASS INDEX: 27.82 KG/M2 | SYSTOLIC BLOOD PRESSURE: 125 MMHG

## 2018-04-24 VITALS — DIASTOLIC BLOOD PRESSURE: 58 MMHG | SYSTOLIC BLOOD PRESSURE: 111 MMHG

## 2018-04-24 VITALS — DIASTOLIC BLOOD PRESSURE: 50 MMHG | SYSTOLIC BLOOD PRESSURE: 96 MMHG

## 2018-04-24 DIAGNOSIS — Z3A.36: ICD-10-CM

## 2018-04-24 DIAGNOSIS — O47.03: Primary | ICD-10-CM

## 2018-04-24 LAB
APTT PPP: YELLOW S
BACTERIA #/AREA URNS HPF: (no result) /HPF
BILIRUB UR QL STRIP: NEGATIVE
FIBRINOGEN PPP-MCNC: CLEAR MG/DL
GLUCOSE UR STRIP-MCNC: NEGATIVE MG/DL
KETONES UR QL STRIP: NEGATIVE
LEUKOCYTE ESTERASE UR QL STRIP: (no result)
NITRITE UR QL STRIP: NEGATIVE
PH UR STRIP: 6 [PH] (ref 5–9)
PROT UR QL STRIP: NEGATIVE
RBC #/AREA URNS HPF: (no result) /HPF
SP GR UR STRIP: 1.02 (ref 1.02–1.02)
SQUAMOUS #/AREA URNS HPF: (no result) /HPF
UROBILINOGEN UR-MCNC: NORMAL MG/DL
WBC #/AREA URNS HPF: (no result) /HPF

## 2018-04-24 PROCEDURE — 87088 URINE BACTERIA CULTURE: CPT

## 2018-04-24 PROCEDURE — 81000 URINALYSIS NONAUTO W/SCOPE: CPT

## 2018-04-24 PROCEDURE — 99213 OFFICE O/P EST LOW 20 MIN: CPT

## 2018-04-25 NOTE — PHYSICIAN QUERY-FINAL DX
BRIAN RODRIGUEZ 4/25/18 1426:


Clinic Account Progress/Dx


Physician Query:


Please give diagnosis


Date of Service





Apr 24, 2018 at 15:25





PAULINO RESTREPO MD 4/26/18 1146:


Clinic Account Progress/Dx


DIAGNOSIS:


Diagnosis


False labor











BRIAN RODRIGUEZ Apr 25, 2018 14:26


PAULINO RESTREPO MD Apr 26, 2018 11:46

## 2018-05-04 ENCOUNTER — HOSPITAL ENCOUNTER (INPATIENT)
Dept: HOSPITAL 75 - LDRP | Age: 23
LOS: 3 days | Discharge: HOME | End: 2018-05-07
Attending: OBSTETRICS & GYNECOLOGY | Admitting: OBSTETRICS & GYNECOLOGY
Payer: COMMERCIAL

## 2018-05-04 VITALS — DIASTOLIC BLOOD PRESSURE: 59 MMHG | SYSTOLIC BLOOD PRESSURE: 99 MMHG

## 2018-05-04 VITALS — SYSTOLIC BLOOD PRESSURE: 109 MMHG | DIASTOLIC BLOOD PRESSURE: 65 MMHG

## 2018-05-04 VITALS — DIASTOLIC BLOOD PRESSURE: 61 MMHG | SYSTOLIC BLOOD PRESSURE: 128 MMHG

## 2018-05-04 VITALS — SYSTOLIC BLOOD PRESSURE: 122 MMHG | DIASTOLIC BLOOD PRESSURE: 68 MMHG

## 2018-05-04 VITALS — SYSTOLIC BLOOD PRESSURE: 117 MMHG | DIASTOLIC BLOOD PRESSURE: 69 MMHG

## 2018-05-04 VITALS — SYSTOLIC BLOOD PRESSURE: 119 MMHG | DIASTOLIC BLOOD PRESSURE: 71 MMHG

## 2018-05-04 VITALS — SYSTOLIC BLOOD PRESSURE: 103 MMHG | DIASTOLIC BLOOD PRESSURE: 55 MMHG

## 2018-05-04 VITALS — DIASTOLIC BLOOD PRESSURE: 62 MMHG | SYSTOLIC BLOOD PRESSURE: 115 MMHG

## 2018-05-04 VITALS — DIASTOLIC BLOOD PRESSURE: 72 MMHG | SYSTOLIC BLOOD PRESSURE: 116 MMHG

## 2018-05-04 VITALS — SYSTOLIC BLOOD PRESSURE: 112 MMHG | DIASTOLIC BLOOD PRESSURE: 67 MMHG

## 2018-05-04 VITALS — DIASTOLIC BLOOD PRESSURE: 69 MMHG | SYSTOLIC BLOOD PRESSURE: 117 MMHG

## 2018-05-04 VITALS — DIASTOLIC BLOOD PRESSURE: 60 MMHG | SYSTOLIC BLOOD PRESSURE: 106 MMHG

## 2018-05-04 VITALS — SYSTOLIC BLOOD PRESSURE: 117 MMHG | DIASTOLIC BLOOD PRESSURE: 72 MMHG

## 2018-05-04 VITALS — SYSTOLIC BLOOD PRESSURE: 103 MMHG | DIASTOLIC BLOOD PRESSURE: 64 MMHG

## 2018-05-04 VITALS — DIASTOLIC BLOOD PRESSURE: 60 MMHG | SYSTOLIC BLOOD PRESSURE: 103 MMHG

## 2018-05-04 VITALS — BODY MASS INDEX: 28.72 KG/M2 | HEIGHT: 65 IN | WEIGHT: 172.38 LBS

## 2018-05-04 VITALS — SYSTOLIC BLOOD PRESSURE: 99 MMHG | DIASTOLIC BLOOD PRESSURE: 53 MMHG

## 2018-05-04 VITALS — DIASTOLIC BLOOD PRESSURE: 77 MMHG | SYSTOLIC BLOOD PRESSURE: 129 MMHG

## 2018-05-04 VITALS — SYSTOLIC BLOOD PRESSURE: 99 MMHG | DIASTOLIC BLOOD PRESSURE: 55 MMHG

## 2018-05-04 VITALS — DIASTOLIC BLOOD PRESSURE: 65 MMHG | SYSTOLIC BLOOD PRESSURE: 109 MMHG

## 2018-05-04 VITALS — SYSTOLIC BLOOD PRESSURE: 113 MMHG | DIASTOLIC BLOOD PRESSURE: 70 MMHG

## 2018-05-04 VITALS — DIASTOLIC BLOOD PRESSURE: 67 MMHG | SYSTOLIC BLOOD PRESSURE: 114 MMHG

## 2018-05-04 VITALS — SYSTOLIC BLOOD PRESSURE: 108 MMHG | DIASTOLIC BLOOD PRESSURE: 54 MMHG

## 2018-05-04 VITALS — SYSTOLIC BLOOD PRESSURE: 102 MMHG | DIASTOLIC BLOOD PRESSURE: 63 MMHG

## 2018-05-04 VITALS — DIASTOLIC BLOOD PRESSURE: 64 MMHG | SYSTOLIC BLOOD PRESSURE: 113 MMHG

## 2018-05-04 VITALS — SYSTOLIC BLOOD PRESSURE: 110 MMHG | DIASTOLIC BLOOD PRESSURE: 59 MMHG

## 2018-05-04 VITALS — DIASTOLIC BLOOD PRESSURE: 65 MMHG | SYSTOLIC BLOOD PRESSURE: 111 MMHG

## 2018-05-04 VITALS — DIASTOLIC BLOOD PRESSURE: 43 MMHG | SYSTOLIC BLOOD PRESSURE: 114 MMHG

## 2018-05-04 VITALS — DIASTOLIC BLOOD PRESSURE: 70 MMHG | SYSTOLIC BLOOD PRESSURE: 113 MMHG

## 2018-05-04 VITALS — DIASTOLIC BLOOD PRESSURE: 58 MMHG | SYSTOLIC BLOOD PRESSURE: 111 MMHG

## 2018-05-04 VITALS — DIASTOLIC BLOOD PRESSURE: 56 MMHG | SYSTOLIC BLOOD PRESSURE: 95 MMHG

## 2018-05-04 VITALS — SYSTOLIC BLOOD PRESSURE: 118 MMHG | DIASTOLIC BLOOD PRESSURE: 70 MMHG

## 2018-05-04 VITALS — DIASTOLIC BLOOD PRESSURE: 54 MMHG | SYSTOLIC BLOOD PRESSURE: 103 MMHG

## 2018-05-04 VITALS — SYSTOLIC BLOOD PRESSURE: 104 MMHG | DIASTOLIC BLOOD PRESSURE: 56 MMHG

## 2018-05-04 VITALS — DIASTOLIC BLOOD PRESSURE: 61 MMHG | SYSTOLIC BLOOD PRESSURE: 100 MMHG

## 2018-05-04 VITALS — SYSTOLIC BLOOD PRESSURE: 99 MMHG | DIASTOLIC BLOOD PRESSURE: 61 MMHG

## 2018-05-04 VITALS — DIASTOLIC BLOOD PRESSURE: 78 MMHG | SYSTOLIC BLOOD PRESSURE: 115 MMHG

## 2018-05-04 VITALS — SYSTOLIC BLOOD PRESSURE: 117 MMHG | DIASTOLIC BLOOD PRESSURE: 68 MMHG

## 2018-05-04 VITALS — DIASTOLIC BLOOD PRESSURE: 64 MMHG | SYSTOLIC BLOOD PRESSURE: 106 MMHG

## 2018-05-04 VITALS — SYSTOLIC BLOOD PRESSURE: 105 MMHG | DIASTOLIC BLOOD PRESSURE: 56 MMHG

## 2018-05-04 VITALS — SYSTOLIC BLOOD PRESSURE: 110 MMHG | DIASTOLIC BLOOD PRESSURE: 66 MMHG

## 2018-05-04 VITALS — SYSTOLIC BLOOD PRESSURE: 106 MMHG | DIASTOLIC BLOOD PRESSURE: 65 MMHG

## 2018-05-04 VITALS — DIASTOLIC BLOOD PRESSURE: 68 MMHG | SYSTOLIC BLOOD PRESSURE: 117 MMHG

## 2018-05-04 VITALS — DIASTOLIC BLOOD PRESSURE: 54 MMHG | SYSTOLIC BLOOD PRESSURE: 113 MMHG

## 2018-05-04 VITALS — DIASTOLIC BLOOD PRESSURE: 63 MMHG | SYSTOLIC BLOOD PRESSURE: 110 MMHG

## 2018-05-04 VITALS — DIASTOLIC BLOOD PRESSURE: 58 MMHG | SYSTOLIC BLOOD PRESSURE: 106 MMHG

## 2018-05-04 VITALS — DIASTOLIC BLOOD PRESSURE: 57 MMHG | SYSTOLIC BLOOD PRESSURE: 104 MMHG

## 2018-05-04 VITALS — DIASTOLIC BLOOD PRESSURE: 65 MMHG | SYSTOLIC BLOOD PRESSURE: 112 MMHG

## 2018-05-04 VITALS — DIASTOLIC BLOOD PRESSURE: 58 MMHG | SYSTOLIC BLOOD PRESSURE: 102 MMHG

## 2018-05-04 VITALS — DIASTOLIC BLOOD PRESSURE: 55 MMHG | SYSTOLIC BLOOD PRESSURE: 134 MMHG

## 2018-05-04 VITALS — SYSTOLIC BLOOD PRESSURE: 104 MMHG | DIASTOLIC BLOOD PRESSURE: 64 MMHG

## 2018-05-04 VITALS — DIASTOLIC BLOOD PRESSURE: 61 MMHG | SYSTOLIC BLOOD PRESSURE: 108 MMHG

## 2018-05-04 VITALS — SYSTOLIC BLOOD PRESSURE: 102 MMHG | DIASTOLIC BLOOD PRESSURE: 58 MMHG

## 2018-05-04 VITALS — SYSTOLIC BLOOD PRESSURE: 106 MMHG | DIASTOLIC BLOOD PRESSURE: 58 MMHG

## 2018-05-04 VITALS — DIASTOLIC BLOOD PRESSURE: 73 MMHG | SYSTOLIC BLOOD PRESSURE: 112 MMHG

## 2018-05-04 VITALS — DIASTOLIC BLOOD PRESSURE: 57 MMHG | SYSTOLIC BLOOD PRESSURE: 107 MMHG

## 2018-05-04 VITALS — SYSTOLIC BLOOD PRESSURE: 116 MMHG | DIASTOLIC BLOOD PRESSURE: 69 MMHG

## 2018-05-04 VITALS — DIASTOLIC BLOOD PRESSURE: 76 MMHG | SYSTOLIC BLOOD PRESSURE: 115 MMHG

## 2018-05-04 DIAGNOSIS — Z23: ICD-10-CM

## 2018-05-04 DIAGNOSIS — Z3A.38: ICD-10-CM

## 2018-05-04 DIAGNOSIS — O42.92: Primary | ICD-10-CM

## 2018-05-04 LAB
BASOPHILS # BLD AUTO: 0 10^3/UL (ref 0–0.1)
BASOPHILS NFR BLD AUTO: 0 % (ref 0–10)
EOSINOPHIL # BLD AUTO: 0.1 10^3/UL (ref 0–0.3)
EOSINOPHIL NFR BLD AUTO: 1 % (ref 0–10)
ERYTHROCYTE [DISTWIDTH] IN BLOOD BY AUTOMATED COUNT: 13.7 % (ref 10–14.5)
HCT VFR BLD CALC: 33 % (ref 35–52)
HGB BLD-MCNC: 11.3 G/DL (ref 11.5–16)
LYMPHOCYTES # BLD AUTO: 2.1 X 10^3 (ref 1–4)
LYMPHOCYTES NFR BLD AUTO: 18 % (ref 12–44)
MANUAL DIFFERENTIAL PERFORMED BLD QL: NO
MCH RBC QN AUTO: 31 PG (ref 25–34)
MCHC RBC AUTO-ENTMCNC: 34 G/DL (ref 32–36)
MCV RBC AUTO: 90 FL (ref 80–99)
MONOCYTES # BLD AUTO: 0.7 X 10^3 (ref 0–1)
MONOCYTES NFR BLD AUTO: 7 % (ref 0–12)
NEUTROPHILS # BLD AUTO: 8.4 X 10^3 (ref 1.8–7.8)
NEUTROPHILS NFR BLD AUTO: 74 % (ref 42–75)
PLATELET # BLD: 188 10^3/UL (ref 130–400)
PMV BLD AUTO: 10.2 FL (ref 7.4–10.4)
RBC # BLD AUTO: 3.67 10^6/UL (ref 4.35–5.85)
WBC # BLD AUTO: 11.3 10^3/UL (ref 4.3–11)

## 2018-05-04 PROCEDURE — 86900 BLOOD TYPING SEROLOGIC ABO: CPT

## 2018-05-04 PROCEDURE — 86901 BLOOD TYPING SEROLOGIC RH(D): CPT

## 2018-05-04 PROCEDURE — 90715 TDAP VACCINE 7 YRS/> IM: CPT

## 2018-05-04 PROCEDURE — 88307 TISSUE EXAM BY PATHOLOGIST: CPT

## 2018-05-04 PROCEDURE — 36415 COLL VENOUS BLD VENIPUNCTURE: CPT

## 2018-05-04 PROCEDURE — 85025 COMPLETE CBC W/AUTO DIFF WBC: CPT

## 2018-05-04 PROCEDURE — 86850 RBC ANTIBODY SCREEN: CPT

## 2018-05-04 RX ADMIN — CEFAZOLIN SODIUM SCH MLS/HR: 2 SOLUTION INTRAVENOUS at 18:25

## 2018-05-04 RX ADMIN — Medication PRN ML: at 12:21

## 2018-05-04 RX ADMIN — KETOROLAC TROMETHAMINE SCH MG: 30 INJECTION, SOLUTION INTRAMUSCULAR; INTRAVENOUS at 12:06

## 2018-05-04 RX ADMIN — SODIUM CHLORIDE, SODIUM LACTATE, POTASSIUM CHLORIDE, CALCIUM CHLORIDE, AND DEXTROSE MONOHYDRATE SCH MLS/HR: 600; 310; 30; 20; 5 INJECTION, SOLUTION INTRAVENOUS at 18:16

## 2018-05-04 RX ADMIN — SODIUM CHLORIDE, SODIUM LACTATE, POTASSIUM CHLORIDE, CALCIUM CHLORIDE, AND DEXTROSE MONOHYDRATE SCH MLS/HR: 600; 310; 30; 20; 5 INJECTION, SOLUTION INTRAVENOUS at 12:05

## 2018-05-04 RX ADMIN — SODIUM CHLORIDE, SODIUM LACTATE, POTASSIUM CHLORIDE, CALCIUM CHLORIDE, AND DEXTROSE MONOHYDRATE SCH MLS/HR: 600; 310; 30; 20; 5 INJECTION, SOLUTION INTRAVENOUS at 10:40

## 2018-05-04 RX ADMIN — CEFAZOLIN SODIUM SCH MLS/HR: 2 SOLUTION INTRAVENOUS at 12:37

## 2018-05-04 RX ADMIN — Medication PRN ML: at 19:39

## 2018-05-04 NOTE — HISTORY & PHYSICAL
History and Physical


Date Seen by Provider:  May 4, 2018


Time Seen by Provider:  11:54


This patient is a 22-year-old female seen in my clinic on this date.  She is 

being followed for decreasing COSMO and today with increasing concern for rupture 

membranes duration.  Nitrazine was positive.  COSMO has decreased this week from 

previous check with an COSMO of 70+2 now 52 patient was having occasional 

contractions.  Patient is admitted for IV antibiotics and for labor management 

with the intent for vaginal delivery due to rupture membranes of unsure 

duration.  Patient's pregnancy is otherwise been uncomplicated.  Her GBS 

culture was negative.  Patient reports increased vaginal wetness and discharge 

over the last few days versus the rest of her pregnancy





Allergies are none





Locations are prenatal vitamins








Past medical history surgical history obstetric family social histories are per 

the antepartum record





HEENT exam is normal


Neck is supple no lymphadenopathy no thyromegaly


Abdomen is gravid soft nontender nondistended


Extreme show clubbing cyanosis.  There is no Homans sign.


Pelvic exam in clinic reveals cervix is between 2 and 3 similar dilated 50-70 

percent effaced and 0 to -1 station soft mid to slightly posterior.  Nitrazine 

performed with a dry glove exam was positive.





Assessment and plan   37+ week pregnancy with likely with rupture membranes of 

unknown duration.  Patient is admitted now for delivery under the cover of 

lactic antibiotics.  We use Ancef as her GBS culture was negative we still 1 

Gen. coverage due to the unknown duration of the rupture membranes.





Allergies and Home Medications


Allergies


Coded Allergies:  


     No Known Drug Allergies (Unverified , 16)





Home Medications


Prenatal Vit/Iron Fumarate/FA 1 Each Tablet, 1 EACH PO DAILY, (Reported)





Patient Home Medication List


Home Medication List Reviewed:  No





Clinical Quality Measures


DVT/VTE Risk/Contraindication:


Risk Factor Score Per Nursin


RFS Level Per Nursing on Admit:  1=Low/No VTE PPX











PAULINO RESTREPO MD May 4, 2018 11:57 am

## 2018-05-05 VITALS — SYSTOLIC BLOOD PRESSURE: 123 MMHG | DIASTOLIC BLOOD PRESSURE: 60 MMHG

## 2018-05-05 VITALS — SYSTOLIC BLOOD PRESSURE: 103 MMHG | DIASTOLIC BLOOD PRESSURE: 57 MMHG

## 2018-05-05 VITALS — DIASTOLIC BLOOD PRESSURE: 69 MMHG | SYSTOLIC BLOOD PRESSURE: 106 MMHG

## 2018-05-05 VITALS — SYSTOLIC BLOOD PRESSURE: 116 MMHG | DIASTOLIC BLOOD PRESSURE: 68 MMHG

## 2018-05-05 VITALS — SYSTOLIC BLOOD PRESSURE: 113 MMHG | DIASTOLIC BLOOD PRESSURE: 59 MMHG

## 2018-05-05 VITALS — SYSTOLIC BLOOD PRESSURE: 119 MMHG | DIASTOLIC BLOOD PRESSURE: 55 MMHG

## 2018-05-05 VITALS — DIASTOLIC BLOOD PRESSURE: 66 MMHG | SYSTOLIC BLOOD PRESSURE: 138 MMHG

## 2018-05-05 VITALS — SYSTOLIC BLOOD PRESSURE: 172 MMHG | DIASTOLIC BLOOD PRESSURE: 85 MMHG

## 2018-05-05 VITALS — SYSTOLIC BLOOD PRESSURE: 99 MMHG | DIASTOLIC BLOOD PRESSURE: 56 MMHG

## 2018-05-05 VITALS — DIASTOLIC BLOOD PRESSURE: 70 MMHG | SYSTOLIC BLOOD PRESSURE: 119 MMHG

## 2018-05-05 VITALS — SYSTOLIC BLOOD PRESSURE: 113 MMHG | DIASTOLIC BLOOD PRESSURE: 74 MMHG

## 2018-05-05 VITALS — DIASTOLIC BLOOD PRESSURE: 68 MMHG | SYSTOLIC BLOOD PRESSURE: 125 MMHG

## 2018-05-05 VITALS — SYSTOLIC BLOOD PRESSURE: 112 MMHG | DIASTOLIC BLOOD PRESSURE: 57 MMHG

## 2018-05-05 VITALS — DIASTOLIC BLOOD PRESSURE: 51 MMHG | SYSTOLIC BLOOD PRESSURE: 92 MMHG

## 2018-05-05 VITALS — DIASTOLIC BLOOD PRESSURE: 66 MMHG | SYSTOLIC BLOOD PRESSURE: 106 MMHG

## 2018-05-05 VITALS — SYSTOLIC BLOOD PRESSURE: 111 MMHG | DIASTOLIC BLOOD PRESSURE: 64 MMHG

## 2018-05-05 VITALS — DIASTOLIC BLOOD PRESSURE: 69 MMHG | SYSTOLIC BLOOD PRESSURE: 113 MMHG

## 2018-05-05 VITALS — DIASTOLIC BLOOD PRESSURE: 64 MMHG | SYSTOLIC BLOOD PRESSURE: 102 MMHG

## 2018-05-05 VITALS — SYSTOLIC BLOOD PRESSURE: 97 MMHG | DIASTOLIC BLOOD PRESSURE: 57 MMHG

## 2018-05-05 VITALS — DIASTOLIC BLOOD PRESSURE: 56 MMHG | SYSTOLIC BLOOD PRESSURE: 112 MMHG

## 2018-05-05 VITALS — DIASTOLIC BLOOD PRESSURE: 62 MMHG | SYSTOLIC BLOOD PRESSURE: 101 MMHG

## 2018-05-05 PROCEDURE — 0HQ9XZZ REPAIR PERINEUM SKIN, EXTERNAL APPROACH: ICD-10-PCS | Performed by: OBSTETRICS & GYNECOLOGY

## 2018-05-05 RX ADMIN — DOCUSATE SODIUM SCH MG: 100 CAPSULE ORAL at 22:03

## 2018-05-05 RX ADMIN — KETOROLAC TROMETHAMINE SCH MG: 30 INJECTION, SOLUTION INTRAMUSCULAR; INTRAVENOUS at 12:44

## 2018-05-05 RX ADMIN — KETOROLAC TROMETHAMINE SCH MG: 30 INJECTION, SOLUTION INTRAMUSCULAR; INTRAVENOUS at 04:10

## 2018-05-05 RX ADMIN — SODIUM CHLORIDE, SODIUM LACTATE, POTASSIUM CHLORIDE, CALCIUM CHLORIDE, AND DEXTROSE MONOHYDRATE SCH MLS/HR: 600; 310; 30; 20; 5 INJECTION, SOLUTION INTRAVENOUS at 00:24

## 2018-05-05 RX ADMIN — Medication PRN ML: at 00:25

## 2018-05-05 RX ADMIN — IBUPROFEN SCH MG: 800 TABLET, FILM COATED ORAL at 22:03

## 2018-05-05 RX ADMIN — CEFAZOLIN SODIUM SCH MLS/HR: 2 SOLUTION INTRAVENOUS at 00:24

## 2018-05-05 RX ADMIN — IBUPROFEN SCH MG: 800 TABLET, FILM COATED ORAL at 10:26

## 2018-05-05 RX ADMIN — DOCUSATE SODIUM SCH MG: 100 CAPSULE ORAL at 10:25

## 2018-05-05 RX ADMIN — DOCUSATE SODIUM SCH MG: 100 CAPSULE ORAL at 10:26

## 2018-05-05 RX ADMIN — IBUPROFEN SCH MG: 800 TABLET, FILM COATED ORAL at 16:07

## 2018-05-05 NOTE — PROGRESS NOTE-STANDARD
Standard Progress Note


Progress Notes/Assess & Plan


Date Seen by Provider:  May 5, 2018


Time Seen by Provider:  09:15


Progress/Assessment & Plan


Patient is without complaint.  She is ambulating and voiding.  She has good 

pain control.  She is convalescing from a precipitous vaginal delivery in the 

early hours of this morning.








Vital Signs








  Date Time  Temp Pulse Resp B/P (MAP) Pulse Ox O2 Delivery O2 Flow Rate FiO2


 


5/5/18 06:00 98.3 79 18 92/51 (65) 97 Room Air  


 


5/5/18 03:55  106 18 101/62 (75)  Room Air  


 


5/5/18 03:40  90 18 99/56 (70)  Room Air  


 


5/5/18 03:25 98.4 100 18 103/57 (72)  Room Air  


 


5/5/18 03:10 98.2 110 18 113/59 (77)  Room Air  


 


5/5/18 02:55 99.8 102 18 119/55 (76)  Room Air  


 


5/5/18 02:40 99.3 93 18 112/56 (74)  Room Air  


 


5/5/18 02:25 99.5 83 18 112/57 (75)  Room Air  


 


5/5/18 02:10  80 18 123/60 (81)  Room Air  


 


5/5/18 01:55  66 18 138/66 (90)  Room Air  


 


5/5/18 01:38  76 18 172/85 (114) 96 Room Air  


 


5/5/18 01:30  68 18  99 Room Air  


 


5/5/18 01:15  71 18 113/69 (84) 99 Room Air  


 


5/5/18 01:00  69 18 111/64 (80) 98 Room Air  


 


5/5/18 00:45  81 18 113/74 (87) 98 Room Air  


 


5/5/18 00:30  76 18 125/68 (87) 98 Room Air  


 


5/5/18 00:15  63 18 119/70 (86) 98 Room Air  


 


5/5/18 00:00  73 18 116/68 (84) 98 Room Air  


 


5/4/18 23:45  72 18 112/65 (81) 98 Room Air  


 


5/4/18 23:30  82 18 113/54 (73) 98 Room Air  


 


5/4/18 23:15  72 18 105/56 (72) 98 Room Air  


 


5/4/18 23:00  71 18 106/65 (79) 98 Room Air  


 


5/4/18 22:45  90 18 112/73 (86) 98 Room Air  


 


5/4/18 22:30  76 18 117/69 (85) 97 Room Air  


 


5/4/18 22:15  75 18 114/43 (66) 98 Room Air  


 


5/4/18 22:00 99.6 70 18 112/67 (82) 98 Room Air  


 


5/4/18 21:45  69 18 115/76 (89) 99 Room Air  


 


5/4/18 21:30  77 18 128/61 (83) 99 Room Air  


 


5/4/18 21:15  64 18 134/55 (81) 98 Room Air  


 


5/4/18 21:00  71 18 113/64 (80) 98 Non Rebreather 15.00 


 


5/4/18 20:45  80 18 115/78 (90) 98 Non Rebreather 15.00 


 


5/4/18 20:30  67 18 108/61 (77) 99 Non Rebreather 15.00 


 


5/4/18 20:15  68 18 109/65 (80) 100 Non Rebreather 15.00 


 


5/4/18 20:00  72 18 122/68 (86) 100 Non Rebreather 15.00 


 


5/4/18 19:45 98.4 66 18 118/70 (86) 99 Non Rebreather 15.00 


 


5/4/18 19:30  83 18 119/71 (87) 99 Non Rebreather 15.00 


 


5/4/18 19:15  71 18 103/60 (74) 99 Non Rebreather 15.00 


 


5/4/18 18:45 98.1 66 20 109/65 (80) 99 Room Air  


 


5/4/18 18:30  66 20 116/72 (87) 100 Room Air  


 


5/4/18 18:15  79 20 106/58 (74) 97 Room Air  


 


5/4/18 18:00  79 20 106/58 (74) 97 Room Air  


 


5/4/18 17:45  69 20 108/54 (72) 97 Room Air  


 


5/4/18 17:30  72 20 129/77 (94) 98 Room Air  


 


5/4/18 17:15  66 20 102/58 (73) 98 Room Air  


 


5/4/18 17:00  68 20 95/56 (69) 97 Room Air  


 


5/4/18 16:45  76 20 99/59 (72) 97 Room Air  


 


5/4/18 16:30  75 20 107/57 (74) 98 Room Air  


 


5/4/18 16:15  67 20 117/72 (87) 99 Room Air  


 


5/4/18 16:00  75 20 106/64 (78) 98 Room Air  


 


5/4/18 15:45  79 20 103/64 (77) 98 Room Air  


 


5/4/18 15:30  92  100/61 (74) 98   


 


5/4/18 15:15  70  102/63 (76) 98   


 


5/4/18 15:00  67  110/59 (76) 99   


 


5/4/18 14:45  70  117/68 (84) 99   


 


5/4/18 14:30 98.7 69  104/57 (73) 99   


 


5/4/18 14:15  79  116/69 (85) 98   


 


5/4/18 14:00  87 18 110/66 (81) 98   


 


5/4/18 13:45  76  111/58 (75) 98   


 


5/4/18 13:30  90  99/53 (68) 98   


 


5/4/18 13:15  86   97   


 


5/4/18 13:10  77  99/55 (70) 98   


 


5/4/18 13:05  83  103/55 (71) 97   


 


5/4/18 13:00 99.0 75 18 104/64 (77) 98   


 


5/4/18 12:55  100   98   


 


5/4/18 12:50  81  104/56 (72) 97   


 


5/4/18 12:45  78  109/65 (80) 98   


 


5/4/18 12:40  82  103/54 (70) 97   


 


5/4/18 12:35  78  113/70 (84) 99   


 


5/4/18 12:30  85  106/60 (75) 100   


 


5/4/18 12:25  80  117/68 (84) 100   


 


5/4/18 12:20  84  110/63 (79) 100   


 


5/4/18 12:15  80 18 111/65 (80) 99   


 


5/4/18 12:10  78  102/58 (73) 100   


 


5/4/18 12:05  73  117/69 (85) 100   


 


5/4/18 12:00 98.4 77 18 115/62 (79) 100   


 


5/4/18 11:40  157  113/70 (84)    


 


5/4/18 11:10  78  99/61 (74)    


 


5/4/18 10:10 98.9 64 18 114/67 (83)    














I & O 


 


 5/5/18





 07:00


 


Intake Total 1000 ml


 


Balance 1000 ml





Vital signs are stable.  Patient is afebrile.





Fundus is firm below the umbilicus and nontender.


Extremities show clubbing cyanosis.  There is no Homans sign.  There is minimal 

pretibial pitting edema.





Assessment and plan   status post term spontaneous vaginal delivery early this 

morning.  Plan is for routine convalescence.











PAULINO RESTREPO MD May 5, 2018 9:16 am

## 2018-05-05 NOTE — DISCHARGE INSTRUCTIONS
Discharge Instructions


Discharge Medications


New, Converted or Re-Newed RX:  RX on Chart





Patient Instructions


Patient Instructions:  


As directed


Return to The Hospital For:  


As directed





Activity & Diet


Discharge Diet:  No Restrictions


Activity as Tolerated:  No





Orders-Post D/C & Referrals





Follow Up Appt:


Call to make follow up appt. for patient in 4 weeks.





Activity 


Per routine post vaginal delivery instructions.





Please call in RX to patient pharmacy.





Diet as tolerated





Patient may shower or tub bathe as desired.











PAULINO RESTREPO MD May 5, 2018 9:21 am

## 2018-05-05 NOTE — OB LABOR & DELIVERY RECORD
Labor & Delivery


This patient delivered by term spontaneous vaginal delivery a viable male 

infant with Apgars of 8 and 9 at one and 5 minutes respectively and weight of 5 

lbs. 9 oz.  Cord arterial blood gases are pending.  Birth time was 0138.  The 

delivery was unattended.





L&D nurse had performed cervical exam at 0117 and reports that the presenting 

part was at the minus 2 station with cervix 7 cm dilated.  Repeat exam at 0137 

by the same nurse showed a cervix almost completely dilated except for a small 

anterior rim with the vertex presenting part still at the minus 2 station.  

Patient was feeling some pressure at that point and the nurse called and 

apprised me of this exam.  I proceeded promptly to the hospital.





The nurse reports that very shortly after that exam the patient complained of 

dramatically increased pain and pressure then began thrashing about and pushing 

in an out-of-control manner.  The patient was unable to comply with the nursing 

instructions to relax and breathe, rather than push. Promptly at 0138 the 

infant delivered uncontrolled onto the bed with the labor and delivery nurse at 

bedside. 





On my arrival at 0139 the umbilical cord had been clamped and the baby placed 

on mother's abdomen.  Baby was just under 1 minute of age at that point.  

Infant was breathing and crying appropriately, was pink, moving all extremities

, and appeared stable at that point.  I doubly clamped the umbilical cord and 

allowed father to cut the cord. The baby remained on mother's abdomen. 





Assessment of the vagina and perineum revealed a large clot in the vaginal vault

, with the placenta already completely detached and filling the vaginal vault.  

There was a small posterior fourchette laceration with bilateral periurethral 

abrasions.  Cord bloods were obtained and then the placenta was removed from 

the vagina.  The placenta delivered Carrillo and was accompanied by a very large 

clot.  This presentation is suspicious for a spontaneous placental abruption.  

The cervix, vagina, perineum, posterior fourchette, and anus were examined and 

found intact except for the first-degree perineal/posterior fourchette 

laceration and superficial periurethral abrasions.





The posterior fourchette and perineum were infiltrated with 1 percent lidocaine 

with epinephrine to allow for repair of the laceration with a single suture of 3

-0 Vicryl Rapide.  Patient tolerated the delivery and the repair well.





Sponge and needle counts were correct on completion of the delivery and repair.

  Estimated blood loss was 300 mL.  The patient was recovered in the LDR in the 

baby remained with the mother.











PAULINO RSETREPO MD May 5, 2018 3:08 am

## 2018-05-06 VITALS — SYSTOLIC BLOOD PRESSURE: 105 MMHG | DIASTOLIC BLOOD PRESSURE: 58 MMHG

## 2018-05-06 VITALS — DIASTOLIC BLOOD PRESSURE: 62 MMHG | SYSTOLIC BLOOD PRESSURE: 108 MMHG

## 2018-05-06 VITALS — DIASTOLIC BLOOD PRESSURE: 63 MMHG | SYSTOLIC BLOOD PRESSURE: 113 MMHG

## 2018-05-06 VITALS — DIASTOLIC BLOOD PRESSURE: 59 MMHG | SYSTOLIC BLOOD PRESSURE: 102 MMHG

## 2018-05-06 RX ADMIN — IBUPROFEN SCH MG: 800 TABLET, FILM COATED ORAL at 16:02

## 2018-05-06 RX ADMIN — IBUPROFEN SCH MG: 800 TABLET, FILM COATED ORAL at 22:22

## 2018-05-06 RX ADMIN — DOCUSATE SODIUM SCH MG: 100 CAPSULE ORAL at 22:22

## 2018-05-06 RX ADMIN — DOCUSATE SODIUM SCH MG: 100 CAPSULE ORAL at 09:47

## 2018-05-06 RX ADMIN — IBUPROFEN SCH MG: 800 TABLET, FILM COATED ORAL at 04:14

## 2018-05-06 RX ADMIN — IBUPROFEN SCH MG: 800 TABLET, FILM COATED ORAL at 09:48

## 2018-05-06 NOTE — PROGRESS NOTE-STANDARD
Standard Progress Note


Progress Notes/Assess & Plan


Date Seen by Provider:  May 6, 2018


Time Seen by Provider:  08:34


Progress/Assessment & Plan


Patient is without complaint.  She is ambulating and voiding.  She has good 

pain control.  She is convalescing from a precipitous vaginal delivery in the 

early hours of this morning.








Vital Signs








  Date Time  Temp Pulse Resp B/P (MAP) Pulse Ox O2 Delivery O2 Flow Rate FiO2


 


5/5/18 06:00 98.3 79 18 92/51 (65) 97 Room Air  


 


5/5/18 03:55  106 18 101/62 (75)  Room Air  


 


5/5/18 03:40  90 18 99/56 (70)  Room Air  


 


5/5/18 03:25 98.4 100 18 103/57 (72)  Room Air  


 


5/5/18 03:10 98.2 110 18 113/59 (77)  Room Air  


 


5/5/18 02:55 99.8 102 18 119/55 (76)  Room Air  


 


5/5/18 02:40 99.3 93 18 112/56 (74)  Room Air  


 


5/5/18 02:25 99.5 83 18 112/57 (75)  Room Air  


 


5/5/18 02:10  80 18 123/60 (81)  Room Air  


 


5/5/18 01:55  66 18 138/66 (90)  Room Air  


 


5/5/18 01:38  76 18 172/85 (114) 96 Room Air  


 


5/5/18 01:30  68 18  99 Room Air  


 


5/5/18 01:15  71 18 113/69 (84) 99 Room Air  


 


5/5/18 01:00  69 18 111/64 (80) 98 Room Air  


 


5/5/18 00:45  81 18 113/74 (87) 98 Room Air  


 


5/5/18 00:30  76 18 125/68 (87) 98 Room Air  


 


5/5/18 00:15  63 18 119/70 (86) 98 Room Air  


 


5/5/18 00:00  73 18 116/68 (84) 98 Room Air  


 


5/4/18 23:45  72 18 112/65 (81) 98 Room Air  


 


5/4/18 23:30  82 18 113/54 (73) 98 Room Air  


 


5/4/18 23:15  72 18 105/56 (72) 98 Room Air  


 


5/4/18 23:00  71 18 106/65 (79) 98 Room Air  


 


5/4/18 22:45  90 18 112/73 (86) 98 Room Air  


 


5/4/18 22:30  76 18 117/69 (85) 97 Room Air  


 


5/4/18 22:15  75 18 114/43 (66) 98 Room Air  


 


5/4/18 22:00 99.6 70 18 112/67 (82) 98 Room Air  


 


5/4/18 21:45  69 18 115/76 (89) 99 Room Air  


 


5/4/18 21:30  77 18 128/61 (83) 99 Room Air  


 


5/4/18 21:15  64 18 134/55 (81) 98 Room Air  


 


5/4/18 21:00  71 18 113/64 (80) 98 Non Rebreather 15.00 


 


5/4/18 20:45  80 18 115/78 (90) 98 Non Rebreather 15.00 


 


5/4/18 20:30  67 18 108/61 (77) 99 Non Rebreather 15.00 


 


5/4/18 20:15  68 18 109/65 (80) 100 Non Rebreather 15.00 


 


5/4/18 20:00  72 18 122/68 (86) 100 Non Rebreather 15.00 


 


5/4/18 19:45 98.4 66 18 118/70 (86) 99 Non Rebreather 15.00 


 


5/4/18 19:30  83 18 119/71 (87) 99 Non Rebreather 15.00 


 


5/4/18 19:15  71 18 103/60 (74) 99 Non Rebreather 15.00 


 


5/4/18 18:45 98.1 66 20 109/65 (80) 99 Room Air  


 


5/4/18 18:30  66 20 116/72 (87) 100 Room Air  


 


5/4/18 18:15  79 20 106/58 (74) 97 Room Air  


 


5/4/18 18:00  79 20 106/58 (74) 97 Room Air  


 


5/4/18 17:45  69 20 108/54 (72) 97 Room Air  


 


5/4/18 17:30  72 20 129/77 (94) 98 Room Air  


 


5/4/18 17:15  66 20 102/58 (73) 98 Room Air  


 


5/4/18 17:00  68 20 95/56 (69) 97 Room Air  


 


5/4/18 16:45  76 20 99/59 (72) 97 Room Air  


 


5/4/18 16:30  75 20 107/57 (74) 98 Room Air  


 


5/4/18 16:15  67 20 117/72 (87) 99 Room Air  


 


5/4/18 16:00  75 20 106/64 (78) 98 Room Air  


 


5/4/18 15:45  79 20 103/64 (77) 98 Room Air  


 


5/4/18 15:30  92  100/61 (74) 98   


 


5/4/18 15:15  70  102/63 (76) 98   


 


5/4/18 15:00  67  110/59 (76) 99   


 


5/4/18 14:45  70  117/68 (84) 99   


 


5/4/18 14:30 98.7 69  104/57 (73) 99   


 


5/4/18 14:15  79  116/69 (85) 98   


 


5/4/18 14:00  87 18 110/66 (81) 98   


 


5/4/18 13:45  76  111/58 (75) 98   


 


5/4/18 13:30  90  99/53 (68) 98   


 


5/4/18 13:15  86   97   


 


5/4/18 13:10  77  99/55 (70) 98   


 


5/4/18 13:05  83  103/55 (71) 97   


 


5/4/18 13:00 99.0 75 18 104/64 (77) 98   


 


5/4/18 12:55  100   98   


 


5/4/18 12:50  81  104/56 (72) 97   


 


5/4/18 12:45  78  109/65 (80) 98   


 


5/4/18 12:40  82  103/54 (70) 97   


 


5/4/18 12:35  78  113/70 (84) 99   


 


5/4/18 12:30  85  106/60 (75) 100   


 


5/4/18 12:25  80  117/68 (84) 100   


 


5/4/18 12:20  84  110/63 (79) 100   


 


5/4/18 12:15  80 18 111/65 (80) 99   


 


5/4/18 12:10  78  102/58 (73) 100   


 


5/4/18 12:05  73  117/69 (85) 100   


 


5/4/18 12:00 98.4 77 18 115/62 (79) 100   


 


5/4/18 11:40  157  113/70 (84)    


 


5/4/18 11:10  78  99/61 (74)    


 


5/4/18 10:10 98.9 64 18 114/67 (83)    














I & O 


 


 5/5/18





 07:00


 


Intake Total 1000 ml


 


Balance 1000 ml





Vital signs are stable.  Patient is afebrile.





Fundus is firm below the umbilicus and nontender.


Extremities show clubbing cyanosis.  There is no Homans sign.  There is minimal 

pretibial pitting edema.





Assessment and plan   status post term spontaneous vaginal delivery early this 

morning.  Plan is for routine convalescence.





May 6, 2018


Patient is without complaint.  She is ablating, voiding, tolerating by mouth 

well, has good pain control and is requesting discharge home.








Vital Signs








  Date Time  Temp Pulse Resp B/P (MAP) Pulse Ox O2 Delivery O2 Flow Rate FiO2


 


5/6/18 04:15 98.1 59 16 102/59 (73) 99 Room Air  


 


5/5/18 22:05 98.2 81 16 97/57 (70) 99 Room Air  


 


5/5/18 16:00 98.4 82 16 106/66 (79)  Room Air  


 


5/5/18 12:42 97.9 82 18 102/64 (77) 99 Room Air  


 


5/5/18 10:32 98.4 75 18 106/69 (81) 98 Room Air  





Vital signs are stable.  Patient is afebrile.





Fundus is firm below the umbilicus and nontender.


Extremities show no clubbing cyanosis.  There is no Homans sign.





Assessment and plan   postpartum day number 1+ doing well.  Plan is for 

discharge home at patient's request either today or tomorrow


Final Diagnosis


Term spontaneous vaginal delivery at 38+ weeks











PAULINO RESTREPO MD May 6, 2018 8:36 am

## 2018-05-07 VITALS — SYSTOLIC BLOOD PRESSURE: 104 MMHG | DIASTOLIC BLOOD PRESSURE: 66 MMHG

## 2018-05-07 VITALS — SYSTOLIC BLOOD PRESSURE: 113 MMHG | DIASTOLIC BLOOD PRESSURE: 69 MMHG

## 2018-05-07 RX ADMIN — IBUPROFEN SCH MG: 800 TABLET, FILM COATED ORAL at 04:19

## 2018-05-07 RX ADMIN — DOCUSATE SODIUM SCH MG: 100 CAPSULE ORAL at 09:30

## 2018-05-07 RX ADMIN — IBUPROFEN SCH MG: 800 TABLET, FILM COATED ORAL at 09:30

## 2018-05-07 NOTE — ANESTHESIA-REGIONAL POST-OP
Regional


Patient Condition


Mental Status:  Alert, Oriented x3


Circulation:  Same as Pre-Op


Headache:  Absent


Sensation:  Full Recovery


Motor Block:  Absent





Post Op Complications


Complications


post date note from 5/5/2018 at 1405





Follow Up Care/Instructions


Patient Instructions


None needed.





Anesthesia/Patient Condition


Patient is doing well, no complaints, stable vital signs, no apparent adverse 

anesthesia problems.   


No complications reported per nursing.











GLADYS MAJOR CRNA May 7, 2018 00:44

## 2018-05-07 NOTE — DISCHARGE SUMMARY
Discharge Summary


38 week spontaneous vaginal delivery


This patient is a 22-year-old gravid female who is admitted from my clinic on 

Friday secondary to concern for rupture of membranes of unsure duration.  She 

was observed for several hours found to be harry occasionally was 

demonstrating cervical change epidural was placed Pitocin was used to augment 

the labor and in the early hours of Saturday morning she delivered 

precipitously.  The delivery was uncomplicated patient recovered uneventfully.





Through the day on Saturday patient had routine convalescence care, she 

ambulated, voiding, tolerating oral intake well, her baby did well.





On  which is now postpartum day number 1 patient was unchanged she again 

had routine convalescence care.





Now on Monday patient's postpartum day number 2 she is ambulating, voiding, 

tolerating by mouth well, has good pain control.  Patient denies chest pain, 

denies shortness of breath, denies nausea vomiting, and denies headache.  She 

is requesting discharge home.





Principal diagnoses this hospitalization is precipitous spontaneous vaginal 

delivery at 38+ weeks gestation





Secondary diagnoses are the PROM of unknown duration





Operation procedures include fetal monitoring, IV antibiotics, Pitocin 

augmentation of labor, pain is vaginal delivery.





She was given appropriate discharge instructions verbally and in writing a 

couple was placed in chart.  Discharge medications are Percocet and Motrin and 

Colace.  Patient is continue her prenatal vitamins.





Clinical Quality Measures


DVT/VTE Risk/Contraindication:


Risk Factor Score Per Nursin


RFS Level Per Nursing on Admit:  1=Low/No VTE PPX











PAULINO RESTREPO MD May 7, 2018 7:59 am

## 2019-09-06 ENCOUNTER — HOSPITAL ENCOUNTER (OUTPATIENT)
Dept: HOSPITAL 75 - RAD | Age: 24
End: 2019-09-06
Attending: FAMILY MEDICINE
Payer: COMMERCIAL

## 2019-09-06 DIAGNOSIS — R11.2: ICD-10-CM

## 2019-09-06 DIAGNOSIS — N83.201: Primary | ICD-10-CM

## 2019-09-06 PROCEDURE — 74177 CT ABD & PELVIS W/CONTRAST: CPT

## 2019-09-06 PROCEDURE — 36415 COLL VENOUS BLD VENIPUNCTURE: CPT

## 2019-09-06 PROCEDURE — 84703 CHORIONIC GONADOTROPIN ASSAY: CPT

## 2019-09-06 NOTE — CONSULTATION - SURGERY
History of Present Illness


History of Present Illness


Patient Consulted On(renny/time)


9/6/19


 17:27


Time Seen by Provider:  16:29


History of Present Illness


Surgery asked to consult regarding RLQ pain; r/o appendicitis.





I met pt at the lab; where she was waiting to get blood drawn for HCG.  Pt sta

kenan her pain started in the umbilical area and then moved down to RLQ.  She 

describes it as a sharp pain and rates it an 11 out of 10.   She states she 

has never had pain like this before, has a hx of kidney stone and stated this 

doesnt feel like that.  She had some nausea but no emesis and states she felt 

hot at home.  Temp at Drs office was 99.  Pt did eat a couple of chicken 

nuggets at 12pm, but states she is not really hungry.





Allergies and Home Medications


Allergies


Coded Allergies:  


     No Known Drug Allergies (Unverified , 1/22/16)





Home Medications


Docusate Sodium 100 Mg Capsule, 100 MG PO BID


   Prescribed by: PAULINO IGLESIAS on 5/5/18 0919


Ibuprofen 800 Mg Tablet, 800 MG PO Q6H


   Prescribed by: PAULINO IGLESIAS on 5/5/18 0919


Oxycodone HCl/Acetaminophen 1 Each Tablet, 1-2 TAB PO Q4HR PRN for PAIN-MODERATE

TO SEVERE


   Prescribed by: PAULINO IGLESIAS on 5/5/18 0919


Prenatal Vit/Iron Fumarate/FA 1 Each Tablet, 1 EACH PO DAILY, (Reported)





Patient Home Medication List


Home Medication List Reviewed:  Yes





Past Medical-Social-Family Hx


Patient Social History


Alcohol Use:  Denies Use


Recreational Drug Use:  No


Smoking Status:  Never a Smoker


Recent Foreign Travel:  No


Contact w/Someone Who Travel:  No


Recent Hopitalizations:  Yes (KIDNEY STONE RETREVAL ON MONDAY)





Immunizations Up To Date


Tetanus Booster (TDap):  Unknown


PED Vaccines UTD:  No





Seasonal Allergies


Seasonal Allergies:  No





Surgeries


History of Surgeries:  Yes (left knee)





Respiratory


History of Respiratory Disorde:  No





Cardiovascular


History of Cardiac Disorders:  No





Neurological


History of Neurological Disord:  No





Reproductive System


Hx Reproductive Disorders:  No


Female Reproductive Disorders:  Denies





Genitourinary


History of Genitourinary Disor:  Yes


Genitourinary Disorders:  Kidney Stones





Gastrointestinal


History of Gastrointestinal Di:  Yes


Gastrointestinal Disorders:  Gastroesophageal Reflux





Musculoskeletal


History of Musculoskeletal Dis:  No





Endocrine


History of Endocrine Disorders:  No





Cancer


History of Cancer:  No





Psychosocial


History of Psychiatric Problem:  No





Integumentary


History of Skin or Integumenta:  No





Blood Transfusions


History of Blood Disorders:  No


Adverse Reaction to a Blood Tr:  No





Family Medical History


Significant Family History:  Diabetes, Hypertension


Family Medial History:  


Alzheimer's disease


  19 MOTHER (grandfather)


Completed stroke


  19 FATHER (grandfather)


Diabetes mellitus


  19 FATHER (grandparent)


  19 MOTHER (maternal grandparent)


Hypertension


  19 MOTHER (mother)


Non-Hodgkin's lymphoma


Respiratory disorder


  19 MOTHER (copd grandmother)





Review of Systems-General


Constitutional:  chills; No diaphoresis; weakness


EENTM:  No blurred vision, No mouth swelling, No throat swelling


Respiratory:  No cough, No dyspnea on exertion, No short of breath


Cardiovascular:  No chest pain, No edema, No palpitations


Gastrointestinal:  RLQ, abdominal pain; No diarrhea; loss of appetite, nausea; 

No vomiting


Genitourinary:  No dysuria, No frequency, No hematuria


Musculoskeletal:  No joint swelling, No muscle pain, No muscle stiffness


Skin:  No change in color, No change in hair/nails


Psychiatric/Neurological:  Denies Anxiety, Denies Depressed, Denies Seizure, 

Denies Tremors


Other


pt denies any hx of abnormal bleeding or bruising





Physical Exam-General Problems


Physical Exam


Vital Signs


Capillary Refill :


General Appearance:  WD/WN, mild distress


Eyes:  Bilateral Eye PERRL, Bilateral Eye EOMI


HEENT:  pharynx normal; No scleral icterus (R), No scleral icterus (L), No pale 

conjunctivae (R), No pale conjunctivae (L)


Neck:  non-tender, full range of motion, supple, normal inspection


Respiratory:  chest non-tender, lungs clear, normal breath sounds, no 

respiratory distress


Cardiovascular:  regular rate, rhythm, no murmur


Gastrointestinal:  soft, no organomegaly, guarding (voluntary), tenderness 

(right lower quadrant with minimal palpation), hernia


Back:  normal inspection, no CVA tenderness, no vertebral tenderness


Extremities:  normal range of motion, non-tender, normal inspection, no pedal 

edema, no calf tenderness, normal capillary refill


Neurologic/Psychiatric:  CNs II-XII nml as tested, no motor/sensory deficits, 

alert, normal mood/affect, oriented x 3


Skin:  normal color, warm/dry


Lymphatic:  no adenopathy (neck, axilla or groin)





Data Review


Labs


Laboratory Tests


9/6/19 16:44: Serum Pregnancy Test, Qualitative NEGATIVE





Assessment/Plan


RLQ pain


Ruptured Ovarian Cyst





Pt does not need any surgical intervention at this time.  She may continue to 

have some pain; NSAIDS and heating pad or ice may help.  Fluid in pelvis will be


reabsorbed by her body.  She had no questions.  If anything changes she can call

or go to the ER.





Radiologist read this as normal appendix.  I have spent over an hour with the 

pt, following up on labs and radiology films.











CLEMENCIA HUANG DO                Sep 6, 2019 17:33

## 2019-09-06 NOTE — DIAGNOSTIC IMAGING REPORT
PROCEDURE: CT abdomen and pelvis with contrast, rule out

appendicitis.



TECHNIQUE: Multiple contiguous axial images were obtained through

the abdomen and pelvis after the administration of intravenous

contrast.



INDICATION: Right lower quadrant pain with nausea and vomiting.



COMPARISON: Comparison is made with the CT examination from March 1, 2018.



FINDINGS: The lung bases demonstrate no focal infiltrate or

consolidation. There is minimal left basilar atelectasis.



The liver demonstrates no focal intrahepatic abnormality.

Gallbladder is nondistended without radiodense gallstone or

biliary dilatation. The portal veins are patent. The spleen is

normal in size. Pancreas is unremarkable. There is no adrenal

mass. The kidneys enhance normally and appear nonobstructed.



Small and large bowel are normal in caliber without evidence of

obstruction. The appendix appears normal. There is no abnormal

small or large bowel thickening. There is no focal inflammation

within the right lower quadrant.



Urinary bladder is unremarkable. There is an involuting right

ovarian cyst with a small degree of free fluid within the pelvis.

There is no abscess or free air. There is no adenopathy. The

aorta is normal in caliber.



There is no acute or suspicious osseous abnormality.



IMPRESSION:

1. There are no findings of bowel obstruction. The appendix

appears normal. There is no abnormal bowel thickening.

2. Involuting right ovarian cyst with small degree of free fluid

within the pelvis.

3. Otherwise unremarkable CT abdomen and pelvis.



Dictated by: 



  Dictated on workstation # [id number]

## 2021-02-03 ENCOUNTER — HOSPITAL ENCOUNTER (OUTPATIENT)
Dept: HOSPITAL 75 - LDRP | Age: 26
Discharge: HOME | End: 2021-02-03
Attending: OBSTETRICS & GYNECOLOGY
Payer: COMMERCIAL

## 2021-02-03 VITALS — BODY MASS INDEX: 28.1 KG/M2 | WEIGHT: 168.65 LBS | HEIGHT: 65 IN

## 2021-02-03 VITALS — DIASTOLIC BLOOD PRESSURE: 70 MMHG | SYSTOLIC BLOOD PRESSURE: 120 MMHG

## 2021-02-03 DIAGNOSIS — O47.03: Primary | ICD-10-CM

## 2021-02-03 DIAGNOSIS — Z3A.34: ICD-10-CM

## 2021-02-03 PROCEDURE — 36415 COLL VENOUS BLD VENIPUNCTURE: CPT

## 2021-02-03 PROCEDURE — 99214 OFFICE O/P EST MOD 30 MIN: CPT

## 2021-02-03 PROCEDURE — 87070 CULTURE OTHR SPECIMN AEROBIC: CPT

## 2021-02-03 PROCEDURE — 87205 SMEAR GRAM STAIN: CPT

## 2021-02-03 PROCEDURE — 87210 SMEAR WET MOUNT SALINE/INK: CPT

## 2021-02-03 PROCEDURE — 87101 SKIN FUNGI CULTURE: CPT

## 2021-02-03 PROCEDURE — 89060 EXAM SYNOVIAL FLUID CRYSTALS: CPT

## 2021-02-03 PROCEDURE — 87075 CULTR BACTERIA EXCEPT BLOOD: CPT

## 2021-02-03 NOTE — NUR
returned phone call.  reviewed pt's admit c/o's, SVE, and monitor tracing.  
new order received for wet prep and fern test.

## 2021-02-03 NOTE — NUR
Nitrazine negative.  no active leaking of fluid noted.  wetness noted in vaginal area.  

SVE closed, thick, anterior and soft- per this RN.

## 2021-02-03 NOTE — NUR
GOGO VALDOVINOS presented to unit via ambulatory from home, accompanied by s/o , with c/o 
LEAKING. GOGO VALDOVINOS weighed, gowned, voided, and to bed.  EFHM and TOCO applied, VS 
taken.  GOGO VALDOVINOS oriented to bed controls, call light, TV, heat, and A/C controls.

## 2021-02-03 NOTE — NUR
pt reports leaking fluid that started yesterday and becoming worse today.  no order or color 
reported.  denies recent intercourse.  +FM.  reports "period" like cramping.  denies urinary 
sx's.

## 2021-02-04 NOTE — PHYSICIAN QUERY-FINAL DX
HIRA PALMER 2/4/21 0816:


Clinic Account Progress/Dx


Physician Query:


Please give diagnosis





Please include # weeks gestation


Date of Service





Feb 3, 2021 at 16:00





PAULINO RESTREPO MD 2/4/21 0818:


Clinic Account Progress/Dx


DIAGNOSIS:


Diagnosis


False labor at 34 weeks gestation











HIRA PALMER                     Feb 4, 2021 08:16


PAULINO RESTREPO MD        Feb 4, 2021 08:18

## 2021-03-02 ENCOUNTER — HOSPITAL ENCOUNTER (INPATIENT)
Dept: HOSPITAL 75 - LDRP | Age: 26
LOS: 1 days | Discharge: HOME | End: 2021-03-03
Attending: OBSTETRICS & GYNECOLOGY | Admitting: OBSTETRICS & GYNECOLOGY
Payer: COMMERCIAL

## 2021-03-02 VITALS — DIASTOLIC BLOOD PRESSURE: 53 MMHG | SYSTOLIC BLOOD PRESSURE: 96 MMHG

## 2021-03-02 VITALS — SYSTOLIC BLOOD PRESSURE: 106 MMHG | DIASTOLIC BLOOD PRESSURE: 58 MMHG

## 2021-03-02 VITALS — DIASTOLIC BLOOD PRESSURE: 70 MMHG | SYSTOLIC BLOOD PRESSURE: 111 MMHG

## 2021-03-02 VITALS — SYSTOLIC BLOOD PRESSURE: 114 MMHG | DIASTOLIC BLOOD PRESSURE: 64 MMHG

## 2021-03-02 VITALS — SYSTOLIC BLOOD PRESSURE: 103 MMHG | DIASTOLIC BLOOD PRESSURE: 61 MMHG

## 2021-03-02 VITALS — DIASTOLIC BLOOD PRESSURE: 59 MMHG | SYSTOLIC BLOOD PRESSURE: 113 MMHG

## 2021-03-02 VITALS — SYSTOLIC BLOOD PRESSURE: 106 MMHG | DIASTOLIC BLOOD PRESSURE: 57 MMHG

## 2021-03-02 VITALS — SYSTOLIC BLOOD PRESSURE: 122 MMHG | DIASTOLIC BLOOD PRESSURE: 56 MMHG

## 2021-03-02 VITALS — DIASTOLIC BLOOD PRESSURE: 68 MMHG | SYSTOLIC BLOOD PRESSURE: 114 MMHG

## 2021-03-02 VITALS — DIASTOLIC BLOOD PRESSURE: 58 MMHG | SYSTOLIC BLOOD PRESSURE: 106 MMHG

## 2021-03-02 VITALS — DIASTOLIC BLOOD PRESSURE: 55 MMHG | SYSTOLIC BLOOD PRESSURE: 93 MMHG

## 2021-03-02 VITALS — WEIGHT: 171.74 LBS | HEIGHT: 65 IN | BODY MASS INDEX: 28.61 KG/M2

## 2021-03-02 VITALS — DIASTOLIC BLOOD PRESSURE: 56 MMHG | SYSTOLIC BLOOD PRESSURE: 103 MMHG

## 2021-03-02 VITALS — SYSTOLIC BLOOD PRESSURE: 161 MMHG | DIASTOLIC BLOOD PRESSURE: 60 MMHG

## 2021-03-02 VITALS — SYSTOLIC BLOOD PRESSURE: 111 MMHG | DIASTOLIC BLOOD PRESSURE: 63 MMHG

## 2021-03-02 VITALS — SYSTOLIC BLOOD PRESSURE: 113 MMHG | DIASTOLIC BLOOD PRESSURE: 61 MMHG

## 2021-03-02 VITALS — DIASTOLIC BLOOD PRESSURE: 70 MMHG | SYSTOLIC BLOOD PRESSURE: 119 MMHG

## 2021-03-02 VITALS — DIASTOLIC BLOOD PRESSURE: 61 MMHG | SYSTOLIC BLOOD PRESSURE: 119 MMHG

## 2021-03-02 VITALS — DIASTOLIC BLOOD PRESSURE: 67 MMHG | SYSTOLIC BLOOD PRESSURE: 116 MMHG

## 2021-03-02 VITALS — SYSTOLIC BLOOD PRESSURE: 108 MMHG | DIASTOLIC BLOOD PRESSURE: 59 MMHG

## 2021-03-02 VITALS — SYSTOLIC BLOOD PRESSURE: 109 MMHG | DIASTOLIC BLOOD PRESSURE: 61 MMHG

## 2021-03-02 VITALS — DIASTOLIC BLOOD PRESSURE: 57 MMHG | SYSTOLIC BLOOD PRESSURE: 113 MMHG

## 2021-03-02 VITALS — SYSTOLIC BLOOD PRESSURE: 105 MMHG | DIASTOLIC BLOOD PRESSURE: 57 MMHG

## 2021-03-02 VITALS — DIASTOLIC BLOOD PRESSURE: 60 MMHG | SYSTOLIC BLOOD PRESSURE: 102 MMHG

## 2021-03-02 VITALS — DIASTOLIC BLOOD PRESSURE: 71 MMHG | SYSTOLIC BLOOD PRESSURE: 107 MMHG

## 2021-03-02 VITALS — SYSTOLIC BLOOD PRESSURE: 106 MMHG | DIASTOLIC BLOOD PRESSURE: 67 MMHG

## 2021-03-02 VITALS — SYSTOLIC BLOOD PRESSURE: 103 MMHG | DIASTOLIC BLOOD PRESSURE: 58 MMHG

## 2021-03-02 VITALS — DIASTOLIC BLOOD PRESSURE: 56 MMHG | SYSTOLIC BLOOD PRESSURE: 108 MMHG

## 2021-03-02 VITALS — SYSTOLIC BLOOD PRESSURE: 119 MMHG | DIASTOLIC BLOOD PRESSURE: 67 MMHG

## 2021-03-02 VITALS — DIASTOLIC BLOOD PRESSURE: 64 MMHG | SYSTOLIC BLOOD PRESSURE: 119 MMHG

## 2021-03-02 VITALS — DIASTOLIC BLOOD PRESSURE: 52 MMHG | SYSTOLIC BLOOD PRESSURE: 99 MMHG

## 2021-03-02 VITALS — DIASTOLIC BLOOD PRESSURE: 61 MMHG | SYSTOLIC BLOOD PRESSURE: 113 MMHG

## 2021-03-02 VITALS — DIASTOLIC BLOOD PRESSURE: 59 MMHG | SYSTOLIC BLOOD PRESSURE: 104 MMHG

## 2021-03-02 VITALS — SYSTOLIC BLOOD PRESSURE: 107 MMHG | DIASTOLIC BLOOD PRESSURE: 59 MMHG

## 2021-03-02 VITALS — SYSTOLIC BLOOD PRESSURE: 112 MMHG | DIASTOLIC BLOOD PRESSURE: 56 MMHG

## 2021-03-02 VITALS — SYSTOLIC BLOOD PRESSURE: 114 MMHG | DIASTOLIC BLOOD PRESSURE: 62 MMHG

## 2021-03-02 VITALS — SYSTOLIC BLOOD PRESSURE: 115 MMHG | DIASTOLIC BLOOD PRESSURE: 54 MMHG

## 2021-03-02 VITALS — DIASTOLIC BLOOD PRESSURE: 61 MMHG | SYSTOLIC BLOOD PRESSURE: 109 MMHG

## 2021-03-02 VITALS — SYSTOLIC BLOOD PRESSURE: 93 MMHG | DIASTOLIC BLOOD PRESSURE: 55 MMHG

## 2021-03-02 VITALS — SYSTOLIC BLOOD PRESSURE: 109 MMHG | DIASTOLIC BLOOD PRESSURE: 63 MMHG

## 2021-03-02 VITALS — DIASTOLIC BLOOD PRESSURE: 57 MMHG | SYSTOLIC BLOOD PRESSURE: 110 MMHG

## 2021-03-02 VITALS — DIASTOLIC BLOOD PRESSURE: 64 MMHG | SYSTOLIC BLOOD PRESSURE: 111 MMHG

## 2021-03-02 VITALS — DIASTOLIC BLOOD PRESSURE: 53 MMHG | SYSTOLIC BLOOD PRESSURE: 101 MMHG

## 2021-03-02 VITALS — SYSTOLIC BLOOD PRESSURE: 106 MMHG | DIASTOLIC BLOOD PRESSURE: 61 MMHG

## 2021-03-02 VITALS — SYSTOLIC BLOOD PRESSURE: 105 MMHG | DIASTOLIC BLOOD PRESSURE: 58 MMHG

## 2021-03-02 VITALS — DIASTOLIC BLOOD PRESSURE: 61 MMHG | SYSTOLIC BLOOD PRESSURE: 107 MMHG

## 2021-03-02 VITALS — SYSTOLIC BLOOD PRESSURE: 102 MMHG | DIASTOLIC BLOOD PRESSURE: 58 MMHG

## 2021-03-02 VITALS — SYSTOLIC BLOOD PRESSURE: 105 MMHG | DIASTOLIC BLOOD PRESSURE: 60 MMHG

## 2021-03-02 VITALS — SYSTOLIC BLOOD PRESSURE: 113 MMHG | DIASTOLIC BLOOD PRESSURE: 63 MMHG

## 2021-03-02 VITALS — SYSTOLIC BLOOD PRESSURE: 121 MMHG | DIASTOLIC BLOOD PRESSURE: 65 MMHG

## 2021-03-02 VITALS — DIASTOLIC BLOOD PRESSURE: 64 MMHG | SYSTOLIC BLOOD PRESSURE: 105 MMHG

## 2021-03-02 VITALS — SYSTOLIC BLOOD PRESSURE: 106 MMHG | DIASTOLIC BLOOD PRESSURE: 59 MMHG

## 2021-03-02 DIAGNOSIS — O41.03X0: Primary | ICD-10-CM

## 2021-03-02 DIAGNOSIS — Z20.822: ICD-10-CM

## 2021-03-02 DIAGNOSIS — Z3A.38: ICD-10-CM

## 2021-03-02 LAB
BASOPHILS # BLD AUTO: 0 10^3/UL (ref 0–0.1)
BASOPHILS NFR BLD AUTO: 0 % (ref 0–10)
EOSINOPHIL # BLD AUTO: 0.1 10^3/UL (ref 0–0.3)
EOSINOPHIL NFR BLD AUTO: 1 % (ref 0–10)
HCT VFR BLD CALC: 33 % (ref 35–52)
HGB BLD-MCNC: 11.3 G/DL (ref 11.5–16)
LYMPHOCYTES # BLD AUTO: 2.6 10^3/UL (ref 1–4)
LYMPHOCYTES NFR BLD AUTO: 25 % (ref 12–44)
MANUAL DIFFERENTIAL PERFORMED BLD QL: NO
MCH RBC QN AUTO: 32 PG (ref 25–34)
MCHC RBC AUTO-ENTMCNC: 34 G/DL (ref 32–36)
MCV RBC AUTO: 93 FL (ref 80–99)
MONOCYTES # BLD AUTO: 0.5 10^3/UL (ref 0–1)
MONOCYTES NFR BLD AUTO: 5 % (ref 0–12)
NEUTROPHILS # BLD AUTO: 7 10^3/UL (ref 1.8–7.8)
NEUTROPHILS NFR BLD AUTO: 68 % (ref 42–75)
PLATELET # BLD: 153 10^3/UL (ref 130–400)
PMV BLD AUTO: 11.1 FL (ref 9–12.2)
WBC # BLD AUTO: 10.4 10^3/UL (ref 4.3–11)

## 2021-03-02 PROCEDURE — 86850 RBC ANTIBODY SCREEN: CPT

## 2021-03-02 PROCEDURE — 85025 COMPLETE CBC W/AUTO DIFF WBC: CPT

## 2021-03-02 PROCEDURE — 87635 SARS-COV-2 COVID-19 AMP PRB: CPT

## 2021-03-02 PROCEDURE — 86901 BLOOD TYPING SEROLOGIC RH(D): CPT

## 2021-03-02 PROCEDURE — 86900 BLOOD TYPING SEROLOGIC ABO: CPT

## 2021-03-02 PROCEDURE — 36415 COLL VENOUS BLD VENIPUNCTURE: CPT

## 2021-03-02 RX ADMIN — SODIUM CHLORIDE, SODIUM LACTATE, POTASSIUM CHLORIDE, CALCIUM CHLORIDE, AND DEXTROSE MONOHYDRATE SCH MLS/HR: 600; 310; 30; 20; 5 INJECTION, SOLUTION INTRAVENOUS at 23:24

## 2021-03-02 RX ADMIN — Medication SCH MLS/HR: at 08:15

## 2021-03-02 RX ADMIN — SODIUM CHLORIDE, SODIUM LACTATE, POTASSIUM CHLORIDE, CALCIUM CHLORIDE, AND DEXTROSE MONOHYDRATE SCH MLS/HR: 600; 310; 30; 20; 5 INJECTION, SOLUTION INTRAVENOUS at 08:13

## 2021-03-02 RX ADMIN — KETOROLAC TROMETHAMINE SCH MG: 30 INJECTION, SOLUTION INTRAMUSCULAR; INTRAVENOUS at 19:10

## 2021-03-02 RX ADMIN — DOCUSATE SODIUM SCH MG: 100 CAPSULE ORAL at 21:03

## 2021-03-02 NOTE — DISCHARGE INST-SURGICAL
Discharge Inst-Surgical


Depart Medication/Instructions


New, Converted or Re-Newed RX:  RX on Chart





Consults/Follow Up


Patient Instructions:  


As directed


Orders & Referrals





Follow Up Appt:


Call to make follow up appt. for patient in 4 weeks.





Activity 


Per routine post vaginal delivery instructions.





Please call in RX to patient pharmacy.





Diet as tolerated





Patient may shower or tub bathe as desired.





Activity


Activity as Tolerated:  No





Diet


Discharge Diet:  No Restrictions











PAULINO RESTREPO MD        Mar 2, 2021 07:17

## 2021-03-02 NOTE — HISTORY & PHYSICAL
History and Physical


Date Seen by Provider:  Mar 2, 2021


Time Seen by Provider:  07:18


This patient is a 25-year-old  4 para 2 A1 white female who presents for 

induction of labor.  She was seen yesterday in my clinic with persistent extreme

vulvar varicosities but also found with an COSMO of 51.  Patient denies rupture 

membranes or bleeding she does complain of pain and discomfort with the 

varicosities.  She has had no other problems with this pregnancy.  Her GBS 

culture after 35 weeks gestation was negative.





Allergies are none





Medications are prenatal vitamins





Medical social and surgical history is all per the antepartum record





HEENT exam is normal


Neck is supple no lymphadenopathy no thyromegaly


Abdomen is gravid soft nontender nondistended


Extremities show no clubbing or cyanosis.  There is no Homans' sign.





Pelvic exam shows marked particularly right vulvar varicosities





Cervix at last exam was 2 and half centimeters dilated 70% effaced -1 station 

soft and mid to slightly anterior given a Valentino score of 8 or greater





Assessment and plan      term pregnancy at 38 weeks gestation admitted for 

induction of labor due to oligohydramnios and a severe/extreme vulvar 

varicosities.  Anticipation is for vaginal delivery


Oligohydramnios/extreme vulvar varicosities/38 weeks





Allergies and Home Medications


Allergies


Coded Allergies:  


     No Known Drug Allergies (Unverified , 16)





Home Medications


Docusate Sodium 100 Mg Capsule, 100 MG PO BID


   Prescribed by: PAULINO IGLESIAS on 3/2/21 0717


Ibuprofen 800 Mg Tablet, 800 MG PO Q6H PRN for PAIN


   Prescribed by: PAULINO IGLESIAS on 3/2/21 0717


Oxycodone HCl/Acetaminophen 1 Each Tablet, 1 TAB PO Q4H


   Prescribed by: PAULINO IGLESIAS on 3/2/21 0717


Prenatal Vit/Iron Fumarate/FA 1 Each Tablet, 1 EACH PO DAILY, (Reported)





Patient Home Medication List


Home Medication List Reviewed:  Yes











PAULINO RESTREPO MD        Mar 2, 2021 07:21

## 2021-03-03 VITALS — DIASTOLIC BLOOD PRESSURE: 52 MMHG | SYSTOLIC BLOOD PRESSURE: 103 MMHG

## 2021-03-03 VITALS — SYSTOLIC BLOOD PRESSURE: 105 MMHG | DIASTOLIC BLOOD PRESSURE: 58 MMHG

## 2021-03-03 VITALS — SYSTOLIC BLOOD PRESSURE: 90 MMHG | DIASTOLIC BLOOD PRESSURE: 51 MMHG

## 2021-03-03 VITALS — SYSTOLIC BLOOD PRESSURE: 104 MMHG | DIASTOLIC BLOOD PRESSURE: 69 MMHG

## 2021-03-03 PROCEDURE — 0HQ9XZZ REPAIR PERINEUM SKIN, EXTERNAL APPROACH: ICD-10-PCS | Performed by: OBSTETRICS & GYNECOLOGY

## 2021-03-03 RX ADMIN — SODIUM CHLORIDE, SODIUM LACTATE, POTASSIUM CHLORIDE, CALCIUM CHLORIDE, AND DEXTROSE MONOHYDRATE SCH MLS/HR: 600; 310; 30; 20; 5 INJECTION, SOLUTION INTRAVENOUS at 03:42

## 2021-03-03 RX ADMIN — KETOROLAC TROMETHAMINE SCH MG: 30 INJECTION, SOLUTION INTRAMUSCULAR; INTRAVENOUS at 00:39

## 2021-03-03 RX ADMIN — DOCUSATE SODIUM SCH MG: 100 CAPSULE ORAL at 08:28

## 2021-03-03 RX ADMIN — KETOROLAC TROMETHAMINE SCH MG: 30 INJECTION, SOLUTION INTRAMUSCULAR; INTRAVENOUS at 06:29

## 2021-03-03 RX ADMIN — Medication SCH MLS/HR: at 06:34

## 2021-03-03 NOTE — OPERATIVE REPORT
DATE OF SERVICE:  03/02/2021



DELIVERY NOTE



The patient delivered by term spontaneous vaginal delivery a viable male infant

with Apgars of 9 and 9 at 1 and 5 minutes respectively, weight of 6 pounds,

birth time of 1447 and cord blood pH that is pending.  The infant delivered over

a first-degree perineal laceration under epidural analgesia.  The infant was

bulb suctioned on delivery.  Umbilical cord when relatively pulseless was doubly

clamped, the father cut the cord, the baby was passed to mom's abdomen.



The placenta delivered fairly promptly spontaneously sánchez it was normal with 
a

3-vessel cord.  The cervix, vagina, rectum, and perineum were examined and found

to be intact, except for a 1.5 cm first-degree perineal/posterior fourchette

laceration that was repaired with a single suture of 3-0 Vicryl Rapide in a

running locked fashion.



Sponge and needle counts were correct on completion of delivery and repair. 

Blood loss was around 150 mL.  The patient tolerated the delivery and the repair

well and remained in the LDR for recovery.  The baby remained with the mom.





Job ID: [id number]

DocumentID: [id number]

Dictated Date:  03/03/2021 07:20:16

Transcription Date: 03/03/2021 07:58:30

Dictated By: PAULINO RESTREPO MD

MTDD

## 2021-03-03 NOTE — ANESTHESIA-REGIONAL POST-OP
Regional


Patient Condition


Mental Status:  Alert, Oriented x3


Circulation:  Same as Pre-Op


Headache:  Absent


Sensation:  Full Recovery


Motor Block:  Absent





Post Op Complications


Complications


None





Follow Up Care/Instructions


Patient Instructions


None needed.





Anesthesia/Patient Condition


Patient is doing well, no complaints, stable vital signs, no apparent adverse 

anesthesia problems.   


No complications reported per nursing.


D/C home per [facility]:  BENNY Jarquin CRNA            Mar 3, 2021 07:14

## 2021-03-03 NOTE — PROGRESS NOTE
Standard Progress Note


Progress Notes/Assess & Plan


Date Seen by a Provider:  Mar 3, 2021


Time Seen by a Provider:  07:15


Progress/Assessment & Plan


This patient is without complaint.  She is ambulating, voiding, tolerating oral 

intake well and has good pain control.








Vital Signs








 3/3/21





 03:45


 


Temp 36.3


 


Pulse 60


 


Resp 18


 


B/P (MAP) 103/52 (69)


 


Pulse Ox 97


 


O2 Delivery Room Air











Vital signs are stable.  Patient is afebrile.





Fundus is firm below the umbilicus and nontender.


Extremities show no clubbing or cyanosis.  There is no Homans' sign.





Assessment and plan      postpartum day 1 status post term spontaneous vaginal 

AB doing well.  Plan is for routine convalescent care


Final Diagnosis


38-week spontaneous vaginal delivery











PAULINO RESTREPO MD        Mar 3, 2021 07:16